# Patient Record
Sex: FEMALE | Race: WHITE | NOT HISPANIC OR LATINO | Employment: OTHER | URBAN - NONMETROPOLITAN AREA
[De-identification: names, ages, dates, MRNs, and addresses within clinical notes are randomized per-mention and may not be internally consistent; named-entity substitution may affect disease eponyms.]

---

## 2022-05-16 ENCOUNTER — OFFICE VISIT (OUTPATIENT)
Dept: CARDIOLOGY | Facility: CLINIC | Age: 72
End: 2022-05-16

## 2022-05-16 VITALS
WEIGHT: 158 LBS | BODY MASS INDEX: 29.08 KG/M2 | HEIGHT: 62 IN | SYSTOLIC BLOOD PRESSURE: 130 MMHG | HEART RATE: 60 BPM | OXYGEN SATURATION: 98 % | DIASTOLIC BLOOD PRESSURE: 76 MMHG

## 2022-05-16 DIAGNOSIS — I10 PRIMARY HYPERTENSION: ICD-10-CM

## 2022-05-16 DIAGNOSIS — I48.0 PAROXYSMAL ATRIAL FIBRILLATION: Primary | ICD-10-CM

## 2022-05-16 DIAGNOSIS — R31.9 HEMATURIA, UNSPECIFIED TYPE: ICD-10-CM

## 2022-05-16 PROCEDURE — 99205 OFFICE O/P NEW HI 60 MIN: CPT | Performed by: INTERNAL MEDICINE

## 2022-05-16 PROCEDURE — 93000 ELECTROCARDIOGRAM COMPLETE: CPT | Performed by: INTERNAL MEDICINE

## 2022-05-16 RX ORDER — AMIODARONE HYDROCHLORIDE 200 MG/1
TABLET ORAL
COMMUNITY

## 2022-05-16 RX ORDER — LOSARTAN POTASSIUM AND HYDROCHLOROTHIAZIDE 12.5; 5 MG/1; MG/1
TABLET ORAL
COMMUNITY

## 2022-05-16 RX ORDER — ASPIRIN 81 MG/1
TABLET, CHEWABLE ORAL
COMMUNITY

## 2022-05-16 RX ORDER — SIMVASTATIN 20 MG
TABLET ORAL
Status: ON HOLD | COMMUNITY
End: 2022-07-12

## 2022-05-16 RX ORDER — POTASSIUM CHLORIDE 20 MEQ/1
TABLET, EXTENDED RELEASE ORAL
COMMUNITY

## 2022-05-16 RX ORDER — SELENIUM SULFIDE 22.5 MG/ML
SHAMPOO TOPICAL
COMMUNITY
Start: 2022-01-14

## 2022-05-16 RX ORDER — METOPROLOL SUCCINATE 25 MG/1
TABLET, EXTENDED RELEASE ORAL
COMMUNITY
End: 2022-07-13 | Stop reason: HOSPADM

## 2022-05-16 RX ORDER — ACETAMINOPHEN 500 MG
500 TABLET ORAL EVERY 6 HOURS
COMMUNITY

## 2022-05-16 NOTE — PROGRESS NOTES
[facility] - CARDIOLOGY  New Patient Initial Outpatient Evaluation    Primary Care Physician: Mone Rodríguez MD    Subjective     Chief Complaint: Consider left atrial appendage occlusion    History of Present Illness  71-year-old kindly referred to the [facility] by REGULO Guzmán, for consideration of left atrial appendage occlusion.  She had recently been admitted to the hospital at [facility], after abrupt onset of palpitations and was found to be in atrial fibrillation with rapid ventricular response.  Documentation suggest she converted back to sinus rhythm on IV Cardizem.  She was given aspirin for CVA prophylaxis, due to chronic hematuria.  She was also given amiodarone to try to help maintain sinus rhythm.    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  In Jan, got hematuria and dose of this was reduced.  Was hospitalized 4/25-4/27; took imbruvica that day. Also had colitis w/prior study drug. Has hemorrhoids - has streaks on toilet paper.     Review of Systems   Constitutional: Negative for malaise/fatigue.   Cardiovascular: Positive for dyspnea on exertion and palpitations. Negative for chest pain, claudication, leg swelling, near-syncope, orthopnea, paroxysmal nocturnal dyspnea and syncope.   Respiratory: Negative for shortness of breath.    Hematologic/Lymphatic: Does not bruise/bleed easily.        Otherwise complete ROS reviewed and negative except as mentioned in the HPI.      Past Medical History:   Past Medical History:   Diagnosis Date   • Abnormal ECG    • Arrhythmia    • Atrial fibrillation (HCC)    • Cancer (HCC)    • Chronic kidney disease    • COPD (chronic obstructive pulmonary disease) (HCC)    • Deep vein thrombosis (HCC)    • Hyperlipidemia    • Hypertension        Past Surgical History:History reviewed. No pertinent surgical history.    Family History: family history includes Heart disease in her mother.    Social History:   "reports that she quit smoking about 16 years ago. She started smoking about 60 years ago. She has never used smokeless tobacco. She reports previous alcohol use. She reports that she does not use drugs.    Medications:  Prior to Admission medications    Medication Sig Start Date End Date Taking? Authorizing Provider   acetaminophen (TYLENOL) 500 MG tablet Every 6 (Six) Hours.   Yes Xiao Bob MD   amiodarone (PACERONE) 200 MG tablet amiodarone 200 mg tablet   TAKE 1 TABLET BY MOUTH TWICE DAILY   Yes Xiao Bob MD   aspirin 81 MG chewable tablet aspirin 81 mg chewable tablet   Chew 1 tablet every day by oral route.   Yes Xiao Bob MD   Cholecalciferol 50 MCG (2000 UT) capsule Vitamin D3 50 mcg (2,000 unit) capsule   Take by oral route.   Yes Xiao Bob MD   losartan-hydrochlorothiazide (HYZAAR) 50-12.5 MG per tablet losartan 50 mg-hydrochlorothiazide 12.5 mg tablet   TAKE 1 TABLET BY MOUTH ONCE DAILY   Yes Xiao Bob MD   metoprolol succinate XL (TOPROL-XL) 25 MG 24 hr tablet metoprolol succinate ER 25 mg tablet,extended release 24 hr   Take 1 tablet every day by oral route.   Yes Xiao Bob MD   potassium chloride (K-DUR,KLOR-CON) 20 MEQ CR tablet potassium chloride ER 20 mEq tablet,extended release   Take 1 tablet every day by oral route.   Yes Xiao Bob MD   Selenium Sulfide 2.25 % shampoo selenium sulfide 2.25 % shampoo   APPLY TO WET SCALP ONCE WEEKLY, WORK INTO A FULL LATHER, LEAVE ON SCALP FOR 2-3 MINUTES, RINSE THOUROUGHLY, AND THEN PAT DRY 1/14/22  Yes Xiao Bob MD   simvastatin (ZOCOR) 20 MG tablet simvastatin 20 mg tablet   TAKE 1 TABLET BY MOUTH ONCE DAILY   Yes Xiao Bob MD     Allergies:  No Known Allergies    Objective     Vital Signs: /76   Pulse 60   Ht 157.5 cm (62\")   Wt 71.7 kg (158 lb)   SpO2 98%   BMI 28.90 kg/m²     Vitals and nursing note reviewed.   Constitutional:       General: " Not in acute distress.     Appearance: Not in distress.   Neck:      Vascular: No JVD or JVR. JVD normal.   Pulmonary:      Effort: Pulmonary effort is normal.      Breath sounds: Normal breath sounds.   Cardiovascular:      Normal rate. Regular rhythm.      Murmurs: There is no murmur.      No gallop. No rub.   Pulses:     Intact distal pulses.   Skin:     General: Skin is warm and dry.   Neurological:      Mental Status: Alert, oriented to person, place, and time and oriented to person, place and time.         Results Reviewed:      ECG 12 Lead    Date/Time: 5/16/2022 4:34 PM  Performed by: Steve Lynn MD  Authorized by: Steve Lynn MD   Comparison: compared with previous ECG from 5/10/2022  Similar to previous ECG  Rhythm: sinus rhythm  Rate: normal  BPM: 60  Conduction: left anterior fascicular block  QRS axis: left    Clinical impression: abnormal EKG              Echocardiogram performed at [facility] on 4/26/2022 was reviewed.  This reports an estimated LVEF of 64%, mild LVH, grade 2 diastolic dysfunction, normal size left atrium, normal size and function of the right ventricle.  Mild mitral regurgitation with no other significant valvular pathologies mentioned.    .  Assessment / Plan        Problem List Items Addressed This Visit        Cardiac and Vasculature    Atrial fibrillation (HCC) - Primary    Overview     CHADS-VASc Risk Assessment            3 Total Score    1 Hypertension    1 Age 65-74    1 Sex: Female        Criteria that do not apply:    CHF    Age >/= 75    DM    Vascular Disease                   Relevant Medications    metoprolol succinate XL (TOPROL-XL) 25 MG 24 hr tablet    Other Relevant Orders    ECG 12 Lead    Hypertension    Relevant Medications    losartan-hydrochlorothiazide (HYZAAR) 50-12.5 MG per tablet    metoprolol succinate XL (TOPROL-XL) 25 MG 24 hr tablet       Genitourinary and Reproductive     Hematuria        Recommendations and plans:  Patient has recently diagnosed paroxysmal atrial fibrillation with a CHA2DS2-VASc=3, which warrants CVA prophylaxis.  This patient is currently being treated with aspirin (anticoagulation was not initially prescribed due to concern regarding recent hematuria and history of colitis), but seeks an alternative to indefinite anticoagulation because the same issues for which anticoagulation was not initially prescribed.  As such, I do think this patient meets indications for left atrial appendage occlusion with Watchman device.  We spent the bulk of today's visit discussing the indications, preoperative evaluation, procedural conduct, and postprocedural follow-up care and medication management, emphasizing the need for 6 weeks of anticoagulant therapy (in addition to aspirin 81 mg daily) after a successful implant.  We also discussed need for repeat MAURY 6 weeks after the procedure and, if appendage felt to be successfully excluded at that point in time, that the anticoagulant is discontinued but clopidogrel 75 mg daily will be prescribed to be taken for 4.5 months.    Patient understands all this, and would like to discuss further with her hematologist/oncologist at [place], with whom she has an appointment later this week.  She states she will call us back after that, and if she elects to move forward, I will order a MAURY to assess her left atrial appendage for anatomic feasibility of closure.    In the meantime, we did discuss her current risk of stroke based upon CHADS2-VASc or of 3.2% annually, the fact that taking aspirin 81 mg daily reduces this to approximately 2.7% annually, and that with Xarelto or Eliquis her risk of stroke would be equivalent to that which it would be if she did not have atrial fibrillation (less than 1%).  We also discussed that, with successful Watchman implant, ultimately (in 6 months), she would have a similar stroke risk (less than 1%) but with only taking aspirin 81 mg daily and thus a  significantly lower bleeding risk that would otherwise be conferred with the other option to attain this low stroke risk (ie DOAC).      Therefore, for now, she will continue aspirin and amiodarone as prescribed by Dr. Knight and Mrs. Redding.  If ultimately she elects to proceed with Watchman and we are able to provide her full CVA prophylaxis with LAAC+aspirin, it may be reasonable for her to discontinue amiodarone, unless she were felt to be symptomatic from rate-controlled atrial fibrillation (which is a distinction yet to be made, since she initially presented with symptoms but was in atrial fibrillation with rapid ventricular response).  Ultimately, this decision (regarding antiarrhythmic therapy), will be made by her primary cardiologist, Dr. Knight.    She was also seen today in clinic by our structural heart coordinator, Alejandrina Reinoso RN, and provided with educational materials regarding Watchman device.    Thank you very much for the referral    61 minutes spent on day of service    Steve Lynn MD   05/16/22   16:36 CDT

## 2022-05-18 ENCOUNTER — PATIENT ROUNDING (BHMG ONLY) (OUTPATIENT)
Dept: CARDIOLOGY | Facility: CLINIC | Age: 72
End: 2022-05-18

## 2022-05-18 NOTE — PROGRESS NOTES
A [employer] message has been sent to the patient for PATIENT ROUNDING with [facility].

## 2022-05-20 ENCOUNTER — TELEPHONE (OUTPATIENT)
Dept: CARDIOLOGY | Facility: CLINIC | Age: 72
End: 2022-05-20

## 2022-05-20 ENCOUNTER — PREP FOR SURGERY (OUTPATIENT)
Dept: OTHER | Facility: HOSPITAL | Age: 72
End: 2022-05-20

## 2022-05-20 DIAGNOSIS — I48.0 PAF (PAROXYSMAL ATRIAL FIBRILLATION): Primary | ICD-10-CM

## 2022-05-20 RX ORDER — SODIUM CHLORIDE 9 MG/ML
20 INJECTION, SOLUTION INTRAVENOUS CONTINUOUS
Status: CANCELLED | OUTPATIENT
Start: 2022-05-20

## 2022-05-20 RX ORDER — SODIUM CHLORIDE 0.9 % (FLUSH) 0.9 %
10 SYRINGE (ML) INJECTION AS NEEDED
Status: CANCELLED | OUTPATIENT
Start: 2022-05-20

## 2022-05-20 RX ORDER — SODIUM CHLORIDE 0.9 % (FLUSH) 0.9 %
10 SYRINGE (ML) INJECTION EVERY 12 HOURS SCHEDULED
Status: CANCELLED | OUTPATIENT
Start: 2022-05-20

## 2022-05-20 NOTE — TELEPHONE ENCOUNTER
Pt called and said that she has spoken with her doctor at [place] and would like to proceed with the Watchman work up. I explained the pre-cert for MAURY and that we would call once scheduled. She verbalized understanding.

## 2022-05-27 ENCOUNTER — LAB (OUTPATIENT)
Dept: LAB | Facility: HOSPITAL | Age: 72
End: 2022-05-27

## 2022-05-27 DIAGNOSIS — I48.0 PAF (PAROXYSMAL ATRIAL FIBRILLATION): ICD-10-CM

## 2022-05-27 LAB — SARS-COV-2 RNA PNL SPEC NAA+PROBE: NOT DETECTED

## 2022-05-27 PROCEDURE — 87635 SARS-COV-2 COVID-19 AMP PRB: CPT

## 2022-05-27 PROCEDURE — C9803 HOPD COVID-19 SPEC COLLECT: HCPCS

## 2022-05-31 ENCOUNTER — HOSPITAL ENCOUNTER (OUTPATIENT)
Dept: CARDIOLOGY | Facility: HOSPITAL | Age: 72
Discharge: HOME | End: 2022-05-31

## 2022-05-31 ENCOUNTER — ANESTHESIA (OUTPATIENT)
Dept: CARDIOLOGY | Facility: HOSPITAL | Age: 72
End: 2022-05-31

## 2022-05-31 ENCOUNTER — ANESTHESIA EVENT (OUTPATIENT)
Dept: CARDIOLOGY | Facility: HOSPITAL | Age: 72
End: 2022-05-31

## 2022-05-31 VITALS
WEIGHT: 159.2 LBS | OXYGEN SATURATION: 93 % | RESPIRATION RATE: 20 BRPM | BODY MASS INDEX: 29.3 KG/M2 | DIASTOLIC BLOOD PRESSURE: 64 MMHG | TEMPERATURE: 96.8 F | SYSTOLIC BLOOD PRESSURE: 150 MMHG | HEART RATE: 50 BPM | HEIGHT: 62 IN

## 2022-05-31 DIAGNOSIS — I48.0 PAF (PAROXYSMAL ATRIAL FIBRILLATION): ICD-10-CM

## 2022-05-31 PROCEDURE — 93320 DOPPLER ECHO COMPLETE: CPT | Performed by: INTERNAL MEDICINE

## 2022-05-31 PROCEDURE — 93325 DOPPLER ECHO COLOR FLOW MAPG: CPT | Performed by: INTERNAL MEDICINE

## 2022-05-31 PROCEDURE — 93320 DOPPLER ECHO COMPLETE: CPT

## 2022-05-31 PROCEDURE — 25010000002 PROPOFOL 10 MG/ML EMULSION: Performed by: NURSE ANESTHETIST, CERTIFIED REGISTERED

## 2022-05-31 PROCEDURE — 93312 ECHO TRANSESOPHAGEAL: CPT | Performed by: INTERNAL MEDICINE

## 2022-05-31 PROCEDURE — 93325 DOPPLER ECHO COLOR FLOW MAPG: CPT

## 2022-05-31 PROCEDURE — 93312 ECHO TRANSESOPHAGEAL: CPT

## 2022-05-31 RX ORDER — SODIUM CHLORIDE 9 MG/ML
20 INJECTION, SOLUTION INTRAVENOUS CONTINUOUS
Status: DISCONTINUED | OUTPATIENT
Start: 2022-05-31 | End: 2022-06-01 | Stop reason: HOSPADM

## 2022-05-31 RX ORDER — SODIUM CHLORIDE 0.9 % (FLUSH) 0.9 %
10 SYRINGE (ML) INJECTION EVERY 12 HOURS SCHEDULED
Status: DISCONTINUED | OUTPATIENT
Start: 2022-05-31 | End: 2022-06-01 | Stop reason: HOSPADM

## 2022-05-31 RX ORDER — PROPOFOL 10 MG/ML
VIAL (ML) INTRAVENOUS AS NEEDED
Status: DISCONTINUED | OUTPATIENT
Start: 2022-05-31 | End: 2022-05-31 | Stop reason: SURG

## 2022-05-31 RX ORDER — SODIUM CHLORIDE 0.9 % (FLUSH) 0.9 %
10 SYRINGE (ML) INJECTION AS NEEDED
Status: DISCONTINUED | OUTPATIENT
Start: 2022-05-31 | End: 2022-06-01 | Stop reason: HOSPADM

## 2022-05-31 RX ORDER — LIDOCAINE HYDROCHLORIDE 20 MG/ML
INJECTION, SOLUTION EPIDURAL; INFILTRATION; INTRACAUDAL; PERINEURAL AS NEEDED
Status: DISCONTINUED | OUTPATIENT
Start: 2022-05-31 | End: 2022-05-31 | Stop reason: SURG

## 2022-05-31 RX ADMIN — SODIUM CHLORIDE 20 ML/HR: 9 INJECTION, SOLUTION INTRAVENOUS at 11:55

## 2022-05-31 RX ADMIN — LIDOCAINE HYDROCHLORIDE 50 MG: 20 INJECTION, SOLUTION EPIDURAL; INFILTRATION; INTRACAUDAL; PERINEURAL at 12:51

## 2022-05-31 RX ADMIN — PROPOFOL 50 MG: 10 INJECTION, EMULSION INTRAVENOUS at 12:52

## 2022-05-31 RX ADMIN — LIDOCAINE HYDROCHLORIDE 50 MG: 20 INJECTION, SOLUTION EPIDURAL; INFILTRATION; INTRACAUDAL; PERINEURAL at 12:47

## 2022-05-31 RX ADMIN — PROPOFOL 50 MG: 10 INJECTION, EMULSION INTRAVENOUS at 12:48

## 2022-05-31 NOTE — ANESTHESIA PREPROCEDURE EVALUATION
Anesthesia Evaluation     NPO Solid Status: > 8 hours  NPO Liquid Status: > 4 hours           Airway   Mallampati: II  TM distance: >3 FB  Neck ROM: full  Dental    (+) upper dentures and lower dentures    Pulmonary    (+) COPD,   Cardiovascular   Exercise tolerance: good (4-7 METS)    (+) hypertension, dysrhythmias Atrial Fib, DVT, hyperlipidemia,       Neuro/Psych- negative ROS  GI/Hepatic/Renal/Endo      Musculoskeletal (-) negative ROS    Abdominal    Substance History - negative use     OB/GYN negative ob/gyn ROS         Other                        Anesthesia Plan    ASA 2     MAC     intravenous induction     Anesthetic plan, all risks, benefits, and alternatives have been provided, discussed and informed consent has been obtained with: patient.  Use of blood products discussed with patient .       CODE STATUS:

## 2022-06-01 ENCOUNTER — TELEPHONE (OUTPATIENT)
Dept: CARDIOLOGY | Facility: CLINIC | Age: 72
End: 2022-06-01

## 2022-06-01 LAB
MAXIMAL PREDICTED HEART RATE: 147 BPM
STRESS TARGET HR: 125 BPM

## 2022-06-01 NOTE — TELEPHONE ENCOUNTER
Call to pt to let her know that Dr. Lynn thought her ДМИТРИЙ looked small, but implantable. We have sent her images to the company for review.     We did plan her shared decision appt. She verbalized understanding of the plan of care.

## 2022-06-22 ENCOUNTER — OFFICE VISIT (OUTPATIENT)
Dept: CARDIOLOGY | Facility: CLINIC | Age: 72
End: 2022-06-22

## 2022-06-22 VITALS
HEIGHT: 62 IN | WEIGHT: 163 LBS | BODY MASS INDEX: 30 KG/M2 | DIASTOLIC BLOOD PRESSURE: 84 MMHG | HEART RATE: 50 BPM | SYSTOLIC BLOOD PRESSURE: 118 MMHG | OXYGEN SATURATION: 97 %

## 2022-06-22 DIAGNOSIS — I10 HYPERTENSION, UNSPECIFIED TYPE: ICD-10-CM

## 2022-06-22 DIAGNOSIS — I48.0 PAROXYSMAL ATRIAL FIBRILLATION: Primary | ICD-10-CM

## 2022-06-22 DIAGNOSIS — R31.0 GROSS HEMATURIA: ICD-10-CM

## 2022-06-22 PROBLEM — E78.5 DYSLIPIDEMIA: Status: ACTIVE | Noted: 2022-06-22

## 2022-06-22 PROBLEM — C85.90 LYMPHOMA: Status: ACTIVE | Noted: 2022-06-22

## 2022-06-22 PROBLEM — N18.30 CKD (CHRONIC KIDNEY DISEASE) STAGE 3, GFR 30-59 ML/MIN: Status: ACTIVE | Noted: 2022-06-22

## 2022-06-22 PROCEDURE — 99214 OFFICE O/P EST MOD 30 MIN: CPT | Performed by: INTERNAL MEDICINE

## 2022-06-22 NOTE — PROGRESS NOTES
Reason for Visit: Left atrial appendage closure shared decision making.     HPI:  Justine Bosch is a 71 y.o. female is here today for Left atrial appendage closure shared decision making.  She recently saw Dr. Lynn in clinic for consideration of left atrial appendage occlusion with the watchman device.  She has a history of chronic hematuria and is therefore felt to be a poor candidate for chronic anticoagulation.  She is searching for alternative methods of prophylaxis of stroke.  She is previously admitted to the hospital at [facility] in April with atrial fibrillation with RVR and spontaneously converted back to sinus rhythm.  She has not had any recurrence of atrial fibrillation since being on the amiodarone.  She had to stop her simvastatin due to interactions with the amiodarone.       Patient Active Problem List   Diagnosis   • Atrial fibrillation (HCC)   • Hypertension   • Hematuria   • CKD (chronic kidney disease) stage 3, GFR 30-59 ml/min (HCC)   • Dyslipidemia   • Lymphoma (HCC)       Social History     Tobacco Use   • Smoking status: Former Smoker     Start date:      Quit date:      Years since quittin.4   • Smokeless tobacco: Never Used   Vaping Use   • Vaping Use: Never used   Substance Use Topics   • Alcohol use: Not Currently   • Drug use: Never       Family History   Problem Relation Age of Onset   • Heart disease Mother        The following portions of the patient's history were reviewed and updated as appropriate: allergies, current medications, past family history, past medical history, past social history, past surgical history and problem list.      Current Outpatient Medications:   •  acetaminophen (TYLENOL) 500 MG tablet, Every 6 (Six) Hours., Disp: , Rfl:   •  amiodarone (PACERONE) 200 MG tablet, amiodarone 200 mg tablet  TAKE 1 TABLET BY MOUTH TWICE DAILY, Disp: , Rfl:   •  aspirin 81 MG chewable tablet, aspirin 81 mg chewable tablet  Chew 1 tablet every day by  "oral route., Disp: , Rfl:   •  Cholecalciferol 50 MCG (2000 UT) capsule, Vitamin D3 50 mcg (2,000 unit) capsule  Take by oral route., Disp: , Rfl:   •  losartan-hydrochlorothiazide (HYZAAR) 50-12.5 MG per tablet, losartan 50 mg-hydrochlorothiazide 12.5 mg tablet  TAKE 1 TABLET BY MOUTH ONCE DAILY, Disp: , Rfl:   •  metoprolol succinate XL (TOPROL-XL) 25 MG 24 hr tablet, metoprolol succinate ER 25 mg tablet,extended release 24 hr  Take 1 tablet every day by oral route., Disp: , Rfl:   •  potassium chloride (K-DUR,KLOR-CON) 20 MEQ CR tablet, potassium chloride ER 20 mEq tablet,extended release  Take 1 tablet every day by oral route., Disp: , Rfl:   •  Selenium Sulfide 2.25 % shampoo, selenium sulfide 2.25 % shampoo  APPLY TO WET SCALP ONCE WEEKLY, WORK INTO A FULL LATHER, LEAVE ON SCALP FOR 2-3 MINUTES, RINSE THOUROUGHLY, AND THEN PAT DRY, Disp: , Rfl:   •  simvastatin (ZOCOR) 20 MG tablet, simvastatin 20 mg tablet  TAKE 1 TABLET BY MOUTH ONCE DAILY, Disp: , Rfl:     Review of Systems   Constitutional: Negative for chills and fever.   Cardiovascular: Positive for leg swelling. Negative for chest pain and paroxysmal nocturnal dyspnea.   Respiratory: Negative for cough and shortness of breath.    Hematologic/Lymphatic: Bruises/bleeds easily.   Skin: Negative for rash.   Gastrointestinal: Negative for abdominal pain and heartburn.   Neurological: Negative for dizziness and numbness.       Objective   /84 (BP Location: Left arm, Patient Position: Sitting, Cuff Size: Adult)   Pulse 50   Ht 157.5 cm (62.01\")   Wt 73.9 kg (163 lb)   SpO2 97%   BMI 29.81 kg/m²   Constitutional:       Appearance: Well-developed and overweight.   HENT:      Head: Normocephalic and atraumatic.   Pulmonary:      Effort: Pulmonary effort is normal.      Breath sounds: Normal breath sounds.   Cardiovascular:      Bradycardia present. Regular rhythm.      Murmurs: There is no murmur.      No gallop. No click.   Edema:     Peripheral edema " present.     Pretibial: bilateral trace edema of the pretibial area.     Ankle: bilateral 1+ edema of the ankle.  Skin:     General: Skin is warm and dry.   Neurological:      Mental Status: Alert and oriented to person, place, and time.       Procedures         ICD-10-CM ICD-9-CM   1. Paroxysmal atrial fibrillation (HCC)  I48.0 427.31   2. Hypertension, unspecified type  I10 401.9   3. Gross hematuria  R31.0 599.71         Assessment/Plan:  Based on the history, it has been determined that the patient is a poor candidate for long-term oral anticoagulation.  The patient may, however, be tolerant to short-term anticoagulation as necessary.    Specifically regarding anticoagulation they have demonstrated: hematuria    We have discussed that you need stroke and bleeding risk on and off anticoagulation.  The individual [id number] stroke risk store is 3 (hypertension, female sex, age 65-75), indicating an adjusted stroke rate of 3.2%/year.    Based on both stroke and bleeding risk, shared decision has been made to pursue closure of the left atrial appendage is a safe, effective alternative to long-term oral anticoagulation therapy for stroke prophylaxis.  This will reduce her long-term risk of incidence of bleeding.  Information regarding left atrial appendage closure and shared decision making were provided to patient.

## 2022-06-28 ENCOUNTER — PREP FOR SURGERY (OUTPATIENT)
Dept: OTHER | Facility: HOSPITAL | Age: 72
End: 2022-06-28

## 2022-06-28 DIAGNOSIS — I48.0 PAF (PAROXYSMAL ATRIAL FIBRILLATION): Primary | ICD-10-CM

## 2022-06-28 RX ORDER — SODIUM CHLORIDE 0.9 % (FLUSH) 0.9 %
10 SYRINGE (ML) INJECTION AS NEEDED
Status: CANCELLED | OUTPATIENT
Start: 2022-06-28

## 2022-06-28 RX ORDER — SODIUM CHLORIDE, SODIUM LACTATE, POTASSIUM CHLORIDE, CALCIUM CHLORIDE 600; 310; 30; 20 MG/100ML; MG/100ML; MG/100ML; MG/100ML
1 INJECTION, SOLUTION INTRAVENOUS CONTINUOUS
Status: CANCELLED | OUTPATIENT
Start: 2022-06-28

## 2022-06-28 RX ORDER — ASPIRIN 81 MG/1
324 TABLET, CHEWABLE ORAL ONCE
Status: CANCELLED | OUTPATIENT
Start: 2022-06-28

## 2022-06-28 RX ORDER — BUPIVACAINE HCL/0.9 % NACL/PF 0.1 %
2 PLASTIC BAG, INJECTION (ML) EPIDURAL ONCE
Status: CANCELLED | OUTPATIENT
Start: 2022-06-28 | End: 2022-06-28

## 2022-06-28 RX ORDER — SODIUM CHLORIDE 0.9 % (FLUSH) 0.9 %
10 SYRINGE (ML) INJECTION EVERY 12 HOURS SCHEDULED
Status: CANCELLED | OUTPATIENT
Start: 2022-06-28

## 2022-07-11 ENCOUNTER — HOSPITAL ENCOUNTER (OUTPATIENT)
Dept: GENERAL RADIOLOGY | Facility: HOSPITAL | Age: 72
Discharge: HOME | End: 2022-07-11

## 2022-07-11 ENCOUNTER — HOSPITAL ENCOUNTER (OUTPATIENT)
Dept: CARDIOLOGY | Facility: HOSPITAL | Age: 72
Discharge: HOME | End: 2022-07-11

## 2022-07-11 DIAGNOSIS — I48.0 PAF (PAROXYSMAL ATRIAL FIBRILLATION): ICD-10-CM

## 2022-07-11 LAB
ABO GROUP BLD: NORMAL
ALBUMIN SERPL-MCNC: 4.3 G/DL (ref 3.5–5.2)
ALBUMIN/GLOB SERPL: 1.8 G/DL
ALP SERPL-CCNC: 94 U/L (ref 39–117)
ALT SERPL W P-5'-P-CCNC: 29 U/L (ref 1–33)
ANION GAP SERPL CALCULATED.3IONS-SCNC: 11 MMOL/L (ref 5–15)
APTT PPP: 25.6 SECONDS (ref 24.1–35)
AST SERPL-CCNC: 30 U/L (ref 1–32)
BILIRUB SERPL-MCNC: 0.6 MG/DL (ref 0–1.2)
BLD GP AB SCN SERPL QL: NEGATIVE
BUN SERPL-MCNC: 27 MG/DL (ref 8–23)
BUN/CREAT SERPL: 19.3 (ref 7–25)
CALCIUM SPEC-SCNC: 9.8 MG/DL (ref 8.6–10.5)
CHLORIDE SERPL-SCNC: 102 MMOL/L (ref 98–107)
CO2 SERPL-SCNC: 30 MMOL/L (ref 22–29)
CREAT SERPL-MCNC: 1.4 MG/DL (ref 0.57–1)
DEPRECATED RDW RBC AUTO: 52.1 FL (ref 37–54)
EGFRCR SERPLBLD CKD-EPI 2021: 39.8 ML/MIN/1.73
ERYTHROCYTE [DISTWIDTH] IN BLOOD BY AUTOMATED COUNT: 16 % (ref 12.3–15.4)
GLOBULIN UR ELPH-MCNC: 2.4 GM/DL
GLUCOSE SERPL-MCNC: 88 MG/DL (ref 65–99)
HCT VFR BLD AUTO: 41.7 % (ref 34–46.6)
HGB BLD-MCNC: 12.9 G/DL (ref 12–15.9)
INR PPP: 0.98 (ref 0.91–1.09)
MCH RBC QN AUTO: 27.3 PG (ref 26.6–33)
MCHC RBC AUTO-ENTMCNC: 30.9 G/DL (ref 31.5–35.7)
MCV RBC AUTO: 88.3 FL (ref 79–97)
PLATELET # BLD AUTO: 188 10*3/MM3 (ref 140–450)
PMV BLD AUTO: 10.1 FL (ref 6–12)
POTASSIUM SERPL-SCNC: 4.2 MMOL/L (ref 3.5–5.2)
PROT SERPL-MCNC: 6.7 G/DL (ref 6–8.5)
PROTHROMBIN TIME: 12.6 SECONDS (ref 11.9–14.6)
RBC # BLD AUTO: 4.72 10*6/MM3 (ref 3.77–5.28)
RH BLD: POSITIVE
SODIUM SERPL-SCNC: 143 MMOL/L (ref 136–145)
T&S EXPIRATION DATE: NORMAL
WBC NRBC COR # BLD: 5.38 10*3/MM3 (ref 3.4–10.8)

## 2022-07-11 PROCEDURE — 86901 BLOOD TYPING SEROLOGIC RH(D): CPT | Performed by: INTERNAL MEDICINE

## 2022-07-11 PROCEDURE — 80053 COMPREHEN METABOLIC PANEL: CPT | Performed by: INTERNAL MEDICINE

## 2022-07-11 PROCEDURE — 71046 X-RAY EXAM CHEST 2 VIEWS: CPT

## 2022-07-11 PROCEDURE — 85027 COMPLETE CBC AUTOMATED: CPT | Performed by: INTERNAL MEDICINE

## 2022-07-11 PROCEDURE — 93005 ELECTROCARDIOGRAM TRACING: CPT | Performed by: INTERNAL MEDICINE

## 2022-07-11 PROCEDURE — 85730 THROMBOPLASTIN TIME PARTIAL: CPT | Performed by: INTERNAL MEDICINE

## 2022-07-11 PROCEDURE — 86850 RBC ANTIBODY SCREEN: CPT | Performed by: INTERNAL MEDICINE

## 2022-07-11 PROCEDURE — 93010 ELECTROCARDIOGRAM REPORT: CPT | Performed by: INTERNAL MEDICINE

## 2022-07-11 PROCEDURE — 85610 PROTHROMBIN TIME: CPT | Performed by: INTERNAL MEDICINE

## 2022-07-11 PROCEDURE — 86900 BLOOD TYPING SEROLOGIC ABO: CPT | Performed by: INTERNAL MEDICINE

## 2022-07-12 ENCOUNTER — ANESTHESIA EVENT (OUTPATIENT)
Dept: CARDIOLOGY | Facility: HOSPITAL | Age: 72
End: 2022-07-12

## 2022-07-12 ENCOUNTER — APPOINTMENT (OUTPATIENT)
Dept: CARDIOLOGY | Facility: HOSPITAL | Age: 72
End: 2022-07-12

## 2022-07-12 ENCOUNTER — HOSPITAL ENCOUNTER (INPATIENT)
Facility: HOSPITAL | Age: 72
LOS: 1 days | Discharge: HOME | End: 2022-07-13
Attending: INTERNAL MEDICINE | Admitting: INTERNAL MEDICINE

## 2022-07-12 ENCOUNTER — ANESTHESIA (OUTPATIENT)
Dept: CARDIOLOGY | Facility: HOSPITAL | Age: 72
End: 2022-07-12

## 2022-07-12 DIAGNOSIS — I48.0 PAF (PAROXYSMAL ATRIAL FIBRILLATION): ICD-10-CM

## 2022-07-12 DIAGNOSIS — I48.0 PAROXYSMAL ATRIAL FIBRILLATION: ICD-10-CM

## 2022-07-12 DIAGNOSIS — Z95.818 PRESENCE OF WATCHMAN LEFT ATRIAL APPENDAGE CLOSURE DEVICE: Primary | ICD-10-CM

## 2022-07-12 LAB
ACT BLD: 242 SECONDS (ref 82–152)
MAXIMAL PREDICTED HEART RATE: 147 BPM
SARS-COV-2 RNA PNL SPEC NAA+PROBE: NOT DETECTED
STRESS TARGET HR: 125 BPM

## 2022-07-12 PROCEDURE — 33340 PERQ CLSR TCAT L ATR APNDGE: CPT | Performed by: INTERNAL MEDICINE

## 2022-07-12 PROCEDURE — 93355 ECHO TRANSESOPHAGEAL (TEE): CPT | Performed by: INTERNAL MEDICINE

## 2022-07-12 PROCEDURE — 25010000002 HEPARIN (PORCINE) PER 1000 UNITS: Performed by: NURSE ANESTHETIST, CERTIFIED REGISTERED

## 2022-07-12 PROCEDURE — C1894 INTRO/SHEATH, NON-LASER: HCPCS | Performed by: INTERNAL MEDICINE

## 2022-07-12 PROCEDURE — 25010000002 HEPARIN (PORCINE) 2000-0.9 UNIT/L-% SOLUTION: Performed by: INTERNAL MEDICINE

## 2022-07-12 PROCEDURE — C1893 INTRO/SHEATH, FIXED,NON-PEEL: HCPCS | Performed by: INTERNAL MEDICINE

## 2022-07-12 PROCEDURE — 93355 ECHO TRANSESOPHAGEAL (TEE): CPT

## 2022-07-12 PROCEDURE — C1769 GUIDE WIRE: HCPCS | Performed by: INTERNAL MEDICINE

## 2022-07-12 PROCEDURE — 25010000002 CEFAZOLIN PER 500 MG: Performed by: INTERNAL MEDICINE

## 2022-07-12 PROCEDURE — 85347 COAGULATION TIME ACTIVATED: CPT

## 2022-07-12 PROCEDURE — 25010000002 DEXAMETHASONE PER 1 MG: Performed by: NURSE ANESTHETIST, CERTIFIED REGISTERED

## 2022-07-12 PROCEDURE — 0 IOPAMIDOL PER 1 ML: Performed by: INTERNAL MEDICINE

## 2022-07-12 PROCEDURE — 87635 SARS-COV-2 COVID-19 AMP PRB: CPT | Performed by: INTERNAL MEDICINE

## 2022-07-12 PROCEDURE — C1889 IMPLANT/INSERT DEVICE, NOC: HCPCS | Performed by: INTERNAL MEDICINE

## 2022-07-12 PROCEDURE — 02L73DK OCCLUSION OF LEFT ATRIAL APPENDAGE WITH INTRALUMINAL DEVICE, PERCUTANEOUS APPROACH: ICD-10-PCS | Performed by: INTERNAL MEDICINE

## 2022-07-12 PROCEDURE — 25010000002 ONDANSETRON PER 1 MG: Performed by: NURSE ANESTHETIST, CERTIFIED REGISTERED

## 2022-07-12 PROCEDURE — 25010000002 PROPOFOL 10 MG/ML EMULSION: Performed by: NURSE ANESTHETIST, CERTIFIED REGISTERED

## 2022-07-12 PROCEDURE — B24BZZ4 ULTRASONOGRAPHY OF HEART WITH AORTA, TRANSESOPHAGEAL: ICD-10-PCS | Performed by: INTERNAL MEDICINE

## 2022-07-12 DEVICE — LEFT ATRIAL APPENDAGE CLOSURE DEVICE WITH DELIVERY SYSTEM
Type: IMPLANTABLE DEVICE | Status: FUNCTIONAL
Brand: WATCHMAN FLX™

## 2022-07-12 DEVICE — CAP SYS WATCHMAN TRUSEAL ACC PROC: Type: IMPLANTABLE DEVICE | Status: FUNCTIONAL

## 2022-07-12 DEVICE — CAP WATCHMAN FLX PROC: Type: IMPLANTABLE DEVICE | Status: FUNCTIONAL

## 2022-07-12 RX ORDER — ACETAMINOPHEN 325 MG/1
650 TABLET ORAL EVERY 4 HOURS PRN
Status: DISCONTINUED | OUTPATIENT
Start: 2022-07-12 | End: 2022-07-13 | Stop reason: HOSPADM

## 2022-07-12 RX ORDER — SODIUM CHLORIDE 0.9 % (FLUSH) 0.9 %
10 SYRINGE (ML) INJECTION AS NEEDED
Status: DISCONTINUED | OUTPATIENT
Start: 2022-07-12 | End: 2022-07-12

## 2022-07-12 RX ORDER — SODIUM CHLORIDE, SODIUM LACTATE, POTASSIUM CHLORIDE, CALCIUM CHLORIDE 600; 310; 30; 20 MG/100ML; MG/100ML; MG/100ML; MG/100ML
INJECTION, SOLUTION INTRAVENOUS CONTINUOUS PRN
Status: DISCONTINUED | OUTPATIENT
Start: 2022-07-12 | End: 2022-07-12 | Stop reason: SURG

## 2022-07-12 RX ORDER — DEXAMETHASONE SODIUM PHOSPHATE 4 MG/ML
INJECTION, SOLUTION INTRA-ARTICULAR; INTRALESIONAL; INTRAMUSCULAR; INTRAVENOUS; SOFT TISSUE AS NEEDED
Status: DISCONTINUED | OUTPATIENT
Start: 2022-07-12 | End: 2022-07-12 | Stop reason: SURG

## 2022-07-12 RX ORDER — SODIUM CHLORIDE, SODIUM LACTATE, POTASSIUM CHLORIDE, CALCIUM CHLORIDE 600; 310; 30; 20 MG/100ML; MG/100ML; MG/100ML; MG/100ML
1 INJECTION, SOLUTION INTRAVENOUS CONTINUOUS
Status: DISCONTINUED | OUTPATIENT
Start: 2022-07-12 | End: 2022-07-12

## 2022-07-12 RX ORDER — ONDANSETRON 2 MG/ML
4 INJECTION INTRAMUSCULAR; INTRAVENOUS ONCE AS NEEDED
Status: DISCONTINUED | OUTPATIENT
Start: 2022-07-12 | End: 2022-07-12 | Stop reason: HOSPADM

## 2022-07-12 RX ORDER — DROPERIDOL 2.5 MG/ML
0.62 INJECTION, SOLUTION INTRAMUSCULAR; INTRAVENOUS ONCE AS NEEDED
Status: DISCONTINUED | OUTPATIENT
Start: 2022-07-12 | End: 2022-07-12 | Stop reason: HOSPADM

## 2022-07-12 RX ORDER — FLUMAZENIL 0.1 MG/ML
0.2 INJECTION INTRAVENOUS AS NEEDED
Status: DISCONTINUED | OUTPATIENT
Start: 2022-07-12 | End: 2022-07-12 | Stop reason: HOSPADM

## 2022-07-12 RX ORDER — FENTANYL CITRATE 50 UG/ML
25 INJECTION, SOLUTION INTRAMUSCULAR; INTRAVENOUS
Status: DISCONTINUED | OUTPATIENT
Start: 2022-07-12 | End: 2022-07-12 | Stop reason: HOSPADM

## 2022-07-12 RX ORDER — ASPIRIN 81 MG/1
TABLET, CHEWABLE ORAL
Status: DISPENSED
Start: 2022-07-12 | End: 2022-07-12

## 2022-07-12 RX ORDER — BUPIVACAINE HCL/0.9 % NACL/PF 0.1 %
2 PLASTIC BAG, INJECTION (ML) EPIDURAL ONCE
Status: COMPLETED | OUTPATIENT
Start: 2022-07-12 | End: 2022-07-12

## 2022-07-12 RX ORDER — LIDOCAINE HYDROCHLORIDE 10 MG/ML
0.5 INJECTION, SOLUTION EPIDURAL; INFILTRATION; INTRACAUDAL; PERINEURAL ONCE AS NEEDED
Status: DISCONTINUED | OUTPATIENT
Start: 2022-07-12 | End: 2022-07-13 | Stop reason: HOSPADM

## 2022-07-12 RX ORDER — ASPIRIN 81 MG/1
324 TABLET, CHEWABLE ORAL ONCE
Status: COMPLETED | OUTPATIENT
Start: 2022-07-12 | End: 2022-07-12

## 2022-07-12 RX ORDER — SODIUM CHLORIDE 0.9 % (FLUSH) 0.9 %
3-10 SYRINGE (ML) INJECTION AS NEEDED
Status: DISCONTINUED | OUTPATIENT
Start: 2022-07-12 | End: 2022-07-13 | Stop reason: HOSPADM

## 2022-07-12 RX ORDER — LABETALOL HYDROCHLORIDE 5 MG/ML
5 INJECTION, SOLUTION INTRAVENOUS
Status: DISCONTINUED | OUTPATIENT
Start: 2022-07-12 | End: 2022-07-12 | Stop reason: HOSPADM

## 2022-07-12 RX ORDER — SODIUM CHLORIDE 0.9 % (FLUSH) 0.9 %
10 SYRINGE (ML) INJECTION EVERY 12 HOURS SCHEDULED
Status: DISCONTINUED | OUTPATIENT
Start: 2022-07-12 | End: 2022-07-12

## 2022-07-12 RX ORDER — EPHEDRINE SULFATE 50 MG/ML
INJECTION, SOLUTION INTRAVENOUS AS NEEDED
Status: DISCONTINUED | OUTPATIENT
Start: 2022-07-12 | End: 2022-07-12 | Stop reason: SURG

## 2022-07-12 RX ORDER — HEPARIN SODIUM 1000 [USP'U]/ML
INJECTION, SOLUTION INTRAVENOUS; SUBCUTANEOUS AS NEEDED
Status: DISCONTINUED | OUTPATIENT
Start: 2022-07-12 | End: 2022-07-12 | Stop reason: SURG

## 2022-07-12 RX ORDER — LIDOCAINE HYDROCHLORIDE 20 MG/ML
INJECTION, SOLUTION EPIDURAL; INFILTRATION; INTRACAUDAL; PERINEURAL AS NEEDED
Status: DISCONTINUED | OUTPATIENT
Start: 2022-07-12 | End: 2022-07-12 | Stop reason: SURG

## 2022-07-12 RX ORDER — AMIODARONE HYDROCHLORIDE 200 MG/1
200 TABLET ORAL EVERY 12 HOURS SCHEDULED
Status: DISCONTINUED | OUTPATIENT
Start: 2022-07-12 | End: 2022-07-13 | Stop reason: HOSPADM

## 2022-07-12 RX ORDER — ASPIRIN 81 MG/1
81 TABLET, CHEWABLE ORAL DAILY
Status: DISCONTINUED | OUTPATIENT
Start: 2022-07-13 | End: 2022-07-13 | Stop reason: HOSPADM

## 2022-07-12 RX ORDER — HEPARIN SODIUM 200 [USP'U]/100ML
INJECTION, SOLUTION INTRAVENOUS AS NEEDED
Status: DISCONTINUED | OUTPATIENT
Start: 2022-07-12 | End: 2022-07-12 | Stop reason: HOSPADM

## 2022-07-12 RX ORDER — SODIUM CHLORIDE 0.9 % (FLUSH) 0.9 %
3 SYRINGE (ML) INJECTION EVERY 12 HOURS SCHEDULED
Status: DISCONTINUED | OUTPATIENT
Start: 2022-07-12 | End: 2022-07-13 | Stop reason: HOSPADM

## 2022-07-12 RX ORDER — SODIUM CHLORIDE 9 MG/ML
100 INJECTION, SOLUTION INTRAVENOUS CONTINUOUS
Status: DISCONTINUED | OUTPATIENT
Start: 2022-07-12 | End: 2022-07-12

## 2022-07-12 RX ORDER — ROCURONIUM BROMIDE 10 MG/ML
INJECTION, SOLUTION INTRAVENOUS AS NEEDED
Status: DISCONTINUED | OUTPATIENT
Start: 2022-07-12 | End: 2022-07-12 | Stop reason: SURG

## 2022-07-12 RX ORDER — SODIUM CHLORIDE, SODIUM LACTATE, POTASSIUM CHLORIDE, CALCIUM CHLORIDE 600; 310; 30; 20 MG/100ML; MG/100ML; MG/100ML; MG/100ML
100 INJECTION, SOLUTION INTRAVENOUS CONTINUOUS
Status: DISCONTINUED | OUTPATIENT
Start: 2022-07-12 | End: 2022-07-12

## 2022-07-12 RX ORDER — PROPOFOL 10 MG/ML
VIAL (ML) INTRAVENOUS AS NEEDED
Status: DISCONTINUED | OUTPATIENT
Start: 2022-07-12 | End: 2022-07-12 | Stop reason: SURG

## 2022-07-12 RX ORDER — ONDANSETRON 2 MG/ML
INJECTION INTRAMUSCULAR; INTRAVENOUS AS NEEDED
Status: DISCONTINUED | OUTPATIENT
Start: 2022-07-12 | End: 2022-07-12 | Stop reason: SURG

## 2022-07-12 RX ORDER — NALOXONE HCL 0.4 MG/ML
0.4 VIAL (ML) INJECTION AS NEEDED
Status: DISCONTINUED | OUTPATIENT
Start: 2022-07-12 | End: 2022-07-12 | Stop reason: HOSPADM

## 2022-07-12 RX ADMIN — Medication 2 G: at 09:35

## 2022-07-12 RX ADMIN — ROCURONIUM BROMIDE 50 MG: 50 INJECTION INTRAVENOUS at 09:33

## 2022-07-12 RX ADMIN — GLYCOPYRROLATE 0.4 MG: 0.2 INJECTION INTRAMUSCULAR; INTRAVENOUS at 10:38

## 2022-07-12 RX ADMIN — SODIUM CHLORIDE 100 ML/HR: 9 INJECTION, SOLUTION INTRAVENOUS at 16:46

## 2022-07-12 RX ADMIN — SODIUM CHLORIDE, POTASSIUM CHLORIDE, SODIUM LACTATE AND CALCIUM CHLORIDE: 600; 310; 30; 20 INJECTION, SOLUTION INTRAVENOUS at 09:24

## 2022-07-12 RX ADMIN — ASPIRIN 324 MG: 81 TABLET, CHEWABLE ORAL at 07:55

## 2022-07-12 RX ADMIN — EPHEDRINE SULFATE 10 MG: 50 INJECTION INTRAVENOUS at 09:40

## 2022-07-12 RX ADMIN — HEPARIN SODIUM 3000 UNITS: 1000 INJECTION, SOLUTION INTRAVENOUS; SUBCUTANEOUS at 09:58

## 2022-07-12 RX ADMIN — ACETAMINOPHEN 650 MG: 325 TABLET, FILM COATED ORAL at 21:16

## 2022-07-12 RX ADMIN — PROPOFOL 30 MG: 10 INJECTION, EMULSION INTRAVENOUS at 09:45

## 2022-07-12 RX ADMIN — AMIODARONE HYDROCHLORIDE 200 MG: 200 TABLET ORAL at 21:17

## 2022-07-12 RX ADMIN — HEPARIN SODIUM 3000 UNITS: 1000 INJECTION, SOLUTION INTRAVENOUS; SUBCUTANEOUS at 10:03

## 2022-07-12 RX ADMIN — ONDANSETRON 4 MG: 2 INJECTION INTRAMUSCULAR; INTRAVENOUS at 10:37

## 2022-07-12 RX ADMIN — DEXAMETHASONE SODIUM PHOSPHATE 4 MG: 4 INJECTION INTRA-ARTICULAR; INTRALESIONAL; INTRAMUSCULAR; INTRAVENOUS; SOFT TISSUE at 10:37

## 2022-07-12 RX ADMIN — EPHEDRINE SULFATE 10 MG: 50 INJECTION INTRAVENOUS at 10:30

## 2022-07-12 RX ADMIN — Medication: at 21:17

## 2022-07-12 RX ADMIN — LIDOCAINE HYDROCHLORIDE 100 MG: 20 INJECTION, SOLUTION EPIDURAL; INFILTRATION; INTRACAUDAL; PERINEURAL at 09:33

## 2022-07-12 RX ADMIN — APIXABAN 5 MG: 5 TABLET, FILM COATED ORAL at 21:17

## 2022-07-12 RX ADMIN — PROPOFOL 150 MG: 10 INJECTION, EMULSION INTRAVENOUS at 09:33

## 2022-07-12 NOTE — INTERVAL H&P NOTE
H&P reviewed. The patient was examined and there are no changes to the H&P.      I discussed left atrial appendage occlusion with Watchman device, the procedure, risks (including bleeding, infection, vascular damage [including minor oozing, bruising, bleeding, and up to and including the need for vascular surgery], pericardial effusion requiring pericardiocentesis contrast reaction, renal failure, respiratory failure, heart attack, stroke, arrhythmia and even death), benefits, and alternatives and the patient has voiced understanding and is willing to proceed.

## 2022-07-12 NOTE — ANESTHESIA POSTPROCEDURE EVALUATION
"Patient: Justine Bosch    Procedure Summary     Date: 07/12/22 Room / Location: P[facility] CATH LAB 1 / [facility] CATH INVASIVE LOCATION    Anesthesia Start: 0924 Anesthesia Stop: 1100    Procedure: Atrial Appendage Occlusion (Right ) Diagnosis: PAF (paroxysmal atrial fibrillation) ([facility], located within [facility])    Providers: Steve Lynn MD Provider: Yann Dennis CRNA    Anesthesia Type: general ASA Status: 3          Anesthesia Type: general    Vitals  No vitals data found for the desired time range.          Post Anesthesia Care and Evaluation    Patient location during evaluation: PHASE II  Patient participation: complete - patient participated  Level of consciousness: awake and alert  Pain management: adequate    Airway patency: patent  Anesthetic complications: No anesthetic complications    Cardiovascular status: acceptable  Respiratory status: acceptable  Hydration status: acceptable    Comments: Blood pressure 164/68, pulse (!) 49, temperature 97.2 °F (36.2 °C), temperature source Tympanic, resp. rate 19, height 157.5 cm (62\"), weight 72.6 kg (160 lb), SpO2 96 %.    Pt discharged from PACU based on mariola score >8      "

## 2022-07-12 NOTE — ANESTHESIA PROCEDURE NOTES
Airway  Urgency: elective    Date/Time: 7/12/2022 9:34 AM  Airway not difficult    General Information and Staff    Patient location during procedure: radiology  CRNA/CAA: Yann Dennis CRNA    Indications and Patient Condition  Indications for airway management: airway protection    Preoxygenated: yes  MILS maintained throughout  Mask difficulty assessment: 1 - vent by mask    Final Airway Details  Final airway type: endotracheal airway      Successful airway: ETT  Cuffed: yes   Successful intubation technique: direct laryngoscopy  Facilitating devices/methods: intubating stylet  Endotracheal tube insertion site: oral  Blade: Armas  Blade size: 2  ETT size (mm): 7.0  Cormack-Lehane Classification: grade I - full view of glottis  Placement verified by: chest auscultation and capnometry   Cuff volume (mL): 8  Measured from: lips  ETT/EBT  to lips (cm): 21  Number of attempts at approach: 1  Assessment: lips, teeth, and gum same as pre-op and atraumatic intubation    Additional Comments  ABRAM Scott intubated pt x1 attempt.

## 2022-07-12 NOTE — PLAN OF CARE
Goal Outcome Evaluation:  Plan of Care Reviewed With: patient        Progress: no change  Outcome Evaluation: Patient admitted from the [facility] after she had a watchman placed today. Right groin CDI, stitch has been cut. Sats WNL on room air. No c/o pain. Cont to monitor overnight and possible d/c in am if stable.

## 2022-07-12 NOTE — ANESTHESIA PROCEDURE NOTES
Arterial Line    Pre-sedation assessment completed: 7/12/2022 9:10 AM    Patient reassessed immediately prior to procedure    Patient location during procedure: pre-op  Start time: 7/12/2022 9:11 AM  Stop Time:7/12/2022 9:12 AM       Line placed for hemodynamic monitoring.  Performed By   Anesthesiologist: Kayleen Dhaliwal MD  Preanesthetic Checklist  Completed: patient identified, IV checked, site marked, risks and benefits discussed, surgical consent, monitors and equipment checked, pre-op evaluation and timeout performed  Arterial Line Prep   Sterile Tech: cap, gloves and mask  Prep: ChloraPrep  Patient monitoring: continuous pulse oximetry  Arterial Line Procedure   Laterality:right  Location:  radial artery  Catheter size: 20 G   Guidance: ultrasound guided  PROCEDURE NOTE/ULTRASOUND INTERPRETATION.  Using ultrasound guidance the potential vascular sites for insertion of the catheter were visualized to determine the patency of the vessel to be used for vascular access.  After selecting the appropriate site for insertion, the needle was visualized under ultrasound being inserted into the radial artery, followed by ultrasound confirmation of wire and catheter placement. There were no abnormalities seen on ultrasound; an image was taken; and the patient tolerated the procedure with no complications.   Number of attempts: 1  Successful placement: yes  Post Assessment   Dressing Type: secured with tape and wrist guard applied.   Complications no  Circ/Move/Sens Assessment: normal and unchanged.   Patient Tolerance: patient tolerated the procedure well with no apparent complications

## 2022-07-12 NOTE — ANESTHESIA PREPROCEDURE EVALUATION
Anesthesia Evaluation     history of anesthetic complications: prolonged sedation  NPO Solid Status: > 8 hours  NPO Liquid Status: > 8 hours           Airway   Mallampati: I  TM distance: >3 FB  Neck ROM: full  Dental    (+) edentulous    Pulmonary    (+) a smoker Former, COPD,   Cardiovascular   Exercise tolerance: good (4-7 METS)    Patient on routine beta blocker and Beta blocker given within 24 hours of surgery    (+) hypertension, dysrhythmias Atrial Fib, DVT, hyperlipidemia,       Neuro/Psych- negative ROS  GI/Hepatic/Renal/Endo    (+)   renal disease (hematuria, 12/2021) CRI,     Musculoskeletal (-) negative ROS    Abdominal    Substance History - negative use     OB/GYN negative ob/gyn ROS         Other      history of cancer (B cell lymphoma) active                      Anesthesia Plan    ASA 3     general     intravenous induction     Anesthetic plan, risks, benefits, and alternatives have been provided, discussed and informed consent has been obtained with: patient.        CODE STATUS:

## 2022-07-13 VITALS
HEIGHT: 62 IN | TEMPERATURE: 97.7 F | DIASTOLIC BLOOD PRESSURE: 45 MMHG | SYSTOLIC BLOOD PRESSURE: 115 MMHG | WEIGHT: 160.4 LBS | HEART RATE: 56 BPM | BODY MASS INDEX: 29.52 KG/M2 | OXYGEN SATURATION: 97 % | RESPIRATION RATE: 16 BRPM

## 2022-07-13 PROBLEM — Z95.818 PRESENCE OF WATCHMAN LEFT ATRIAL APPENDAGE CLOSURE DEVICE: Status: ACTIVE | Noted: 2022-07-13

## 2022-07-13 LAB
ANION GAP SERPL CALCULATED.3IONS-SCNC: 9 MMOL/L (ref 5–15)
BUN SERPL-MCNC: 20 MG/DL (ref 8–23)
BUN/CREAT SERPL: 15.6 (ref 7–25)
CALCIUM SPEC-SCNC: 9.6 MG/DL (ref 8.6–10.5)
CHLORIDE SERPL-SCNC: 104 MMOL/L (ref 98–107)
CO2 SERPL-SCNC: 27 MMOL/L (ref 22–29)
CREAT SERPL-MCNC: 1.28 MG/DL (ref 0.57–1)
DEPRECATED RDW RBC AUTO: 52.1 FL (ref 37–54)
EGFRCR SERPLBLD CKD-EPI 2021: 44.3 ML/MIN/1.73
ERYTHROCYTE [DISTWIDTH] IN BLOOD BY AUTOMATED COUNT: 16.3 % (ref 12.3–15.4)
GLUCOSE SERPL-MCNC: 134 MG/DL (ref 65–99)
HCT VFR BLD AUTO: 39.7 % (ref 34–46.6)
HGB BLD-MCNC: 12.1 G/DL (ref 12–15.9)
MCH RBC QN AUTO: 26.7 PG (ref 26.6–33)
MCHC RBC AUTO-ENTMCNC: 30.5 G/DL (ref 31.5–35.7)
MCV RBC AUTO: 87.4 FL (ref 79–97)
PLATELET # BLD AUTO: 180 10*3/MM3 (ref 140–450)
PMV BLD AUTO: 9.9 FL (ref 6–12)
POTASSIUM SERPL-SCNC: 4.3 MMOL/L (ref 3.5–5.2)
QT INTERVAL: 500 MS
QTC INTERVAL: 442 MS
RBC # BLD AUTO: 4.54 10*6/MM3 (ref 3.77–5.28)
SODIUM SERPL-SCNC: 140 MMOL/L (ref 136–145)
WBC NRBC COR # BLD: 8.62 10*3/MM3 (ref 3.4–10.8)

## 2022-07-13 PROCEDURE — 80048 BASIC METABOLIC PNL TOTAL CA: CPT | Performed by: INTERNAL MEDICINE

## 2022-07-13 PROCEDURE — 99239 HOSP IP/OBS DSCHRG MGMT >30: CPT | Performed by: INTERNAL MEDICINE

## 2022-07-13 PROCEDURE — 85027 COMPLETE CBC AUTOMATED: CPT | Performed by: INTERNAL MEDICINE

## 2022-07-13 RX ADMIN — ASPIRIN 81 MG: 81 TABLET, CHEWABLE ORAL at 08:06

## 2022-07-13 RX ADMIN — APIXABAN 5 MG: 5 TABLET, FILM COATED ORAL at 08:07

## 2022-07-13 RX ADMIN — Medication 3 ML: at 08:08

## 2022-07-13 RX ADMIN — AMIODARONE HYDROCHLORIDE 200 MG: 200 TABLET ORAL at 08:06

## 2022-07-13 NOTE — DISCHARGE SUMMARY
[facility] HEART GROUP DISCHARGE    Date of Discharge:  7/13/2022    Discharge Diagnosis:  1.  S/p Watchman implant  2.  Paroxysmal atrial fibrillation  3.  Hematuria  4.  Essential hypertension  5.  Lymphoma  6.  CKD, Stage 3    Presenting Problem/History of Present Illness  Patient was referred to [facility] by REGULO Guzmán, on 5/16/22, consideration of left atrial appendage occlusion.  She had recently been admitted to the hospital at [facility], after abrupt onset of palpitations and was found to be in atrial fibrillation with rapid ventricular response.  Documentation suggested she converted back to sinus rhythm on IV Cardizem.  She was given aspirin for CVA prophylaxis, due to chronic hematuria.  She was also given amiodarone to try to help maintain sinus rhythm.     He previously been on Imbruvica for B-cell lymphoma - started in Dec '21.  In Jan, got hematuria and dose of this was reduced.  Was hospitalized 4/25-4/27; took imbruvica that day. Also had colitis w/prior study drug. Has hemorrhoids - has streaks on toilet paper.      For the above reasons, she was felt to be a poor candidate for indefinite anticoagulation but reasonable for short-term anticoagulation, as ok'd by her hematologist/oncologist in [place] as well as in a 'shared decision making' visit here with Dr Chapin Torres.  MAURY performed by me on 6/1/22 showed her ДМИТРИЙ to be quite small, but felt to be reasonable for attempt at implant, so was scheduled for implant after proper consent was obtained.    Hospital Course  Patient underwent uncomplicated implant of 20mm Watchman FLX device, fully sealing off her ДМИТРИЙ.  She was started on eliquis the night of the procedure, with an uneventful overnight stay on telemetry.  She is being discharged home with plans for eliquis 5mg po bid along with ASA 81mg daily until a follow-up MAURY can be performed in ~6 weeks to assess for healing/endothelialization  of device.  If that looks as expected, then eliquis will be dc'ed and clopidogrel 75mg daily given x4.5 months (in addition to ASA), per usual post-Watchman protocol.    She was noted to remain in sinus bradycardia, with rates 41-55 bpm, throughout her stay, so I advised she discontinue the low dose metoprolol she'd been taking.      She has been on amiodarone and remaining in NSR.  As mentioned in my initial consultation note, now that she has a Watchman device to help with CVA prophylaxis, it may be reasonable for her to discontinue amiodarone, unless she were felt to be symptomatic from rate-controlled atrial fibrillation (which is a distinction yet to be made, since she initially presented with symptoms but was in atrial fibrillation with rapid ventricular response).  Ultimately, this decision (regarding antiarrhythmic therapy), will be made by her primary cardiologist, Dr. Knight, with whom I've emphasized to patient that she should make an appointment to be seen in 2-4 weeks.    Procedures Performed  Procedure(s):  Atrial Appendage Occlusion  07/12 1041 Note By: Steve Lynn MD    Consults:   Consults     No orders found for last 30 day(s).          Pertinent Test Results:   Basic Metabolic Panel    Sodium Sodium   Date Value Ref Range Status   07/13/2022 140 136 - 145 mmol/L Final   07/11/2022 143 136 - 145 mmol/L Final      Potassium Potassium   Date Value Ref Range Status   07/13/2022 4.3 3.5 - 5.2 mmol/L Final     Comment:     Slight hemolysis detected by analyzer. Results may be affected.   07/11/2022 4.2 3.5 - 5.2 mmol/L Final     Comment:     Slight hemolysis detected by analyzer. Results may be affected.      Chloride Chloride   Date Value Ref Range Status   07/13/2022 104 98 - 107 mmol/L Final   07/11/2022 102 98 - 107 mmol/L Final      Bicarbonate No results found for: PLASMABICARB   BUN BUN   Date Value Ref Range Status   07/13/2022 20 8 - 23 mg/dL Final   07/11/2022 27 (H) 8 - 23 mg/dL Final       Creatinine Creatinine   Date Value Ref Range Status   07/13/2022 1.28 (H) 0.57 - 1.00 mg/dL Final   07/11/2022 1.40 (H) 0.57 - 1.00 mg/dL Final      Calcium Calcium   Date Value Ref Range Status   07/13/2022 9.6 8.6 - 10.5 mg/dL Final   07/11/2022 9.8 8.6 - 10.5 mg/dL Final      Glucose      No components found for: GLUCOSE.*     WBC   Date Value Ref Range Status   07/13/2022 8.62 3.40 - 10.80 10*3/mm3 Final     RBC   Date Value Ref Range Status   07/13/2022 4.54 3.77 - 5.28 10*6/mm3 Final     Hemoglobin   Date Value Ref Range Status   07/13/2022 12.1 12.0 - 15.9 g/dL Final     Hematocrit   Date Value Ref Range Status   07/13/2022 39.7 34.0 - 46.6 % Final     MCV   Date Value Ref Range Status   07/13/2022 87.4 79.0 - 97.0 fL Final     MCH   Date Value Ref Range Status   07/13/2022 26.7 26.6 - 33.0 pg Final     MCHC   Date Value Ref Range Status   07/13/2022 30.5 (L) 31.5 - 35.7 g/dL Final     RDW   Date Value Ref Range Status   07/13/2022 16.3 (H) 12.3 - 15.4 % Final     RDW-SD   Date Value Ref Range Status   07/13/2022 52.1 37.0 - 54.0 fl Final     MPV   Date Value Ref Range Status   07/13/2022 9.9 6.0 - 12.0 fL Final     Platelets   Date Value Ref Range Status   07/13/2022 180 140 - 450 10*3/mm3 Final         Condition on Discharge:  stable    Physical Exam at Discharge    Vital Signs  Temp:  [97.2 °F (36.2 °C)-98 °F (36.7 °C)] 97.7 °F (36.5 °C)  Heart Rate:  [46-56] 56  Resp:  [12-18] 16  BP: (115-147)/(45-71) 115/45    Physical Exam:  Vitals and nursing note reviewed.   Constitutional:       General: Not in acute distress.     Appearance: Not in distress.   Neck:      Vascular: No JVD or JVR. JVD normal.   Pulmonary:      Effort: Pulmonary effort is normal.      Breath sounds: Normal breath sounds.   Cardiovascular:      Bradycardia present. Regular rhythm.      Murmurs: There is no murmur.      No gallop. No rub.   Pulses:     Intact distal pulses.   Edema:     Peripheral edema absent.   Skin:     General:  Skin is warm and dry.   Neurological:      Mental Status: Alert, oriented to person, place, and time and oriented to person, place and time.         Discharge Disposition  Home or Self Care    Discharge Medications     Discharge Medications      New Medications      Instructions Start Date   apixaban 5 MG tablet tablet  Commonly known as: ELIQUIS   5 mg, Oral, Every 12 Hours Scheduled         Continue These Medications      Instructions Start Date   acalabrutinib 100 MG capsule capsule  Commonly known as: CALQUENCE   100 mg, Oral, 2 Times Daily, Patient to start taking medication on Friday (7/15/22)      acetaminophen 500 MG tablet  Commonly known as: TYLENOL   500 mg, Oral, Every 6 Hours      amiodarone 200 MG tablet  Commonly known as: PACERONE   amiodarone 200 mg tablet   TAKE 1 TABLET BY MOUTH TWICE DAILY      aspirin 81 MG chewable tablet   aspirin 81 mg chewable tablet   Chew 1 tablet every day by oral route.      Cholecalciferol 50 MCG (2000 UT) capsule   Vitamin D3 50 mcg (2,000 unit) capsule   Take by oral route.      losartan-hydrochlorothiazide 50-12.5 MG per tablet  Commonly known as: HYZAAR   losartan 50 mg-hydrochlorothiazide 12.5 mg tablet   TAKE 1 TABLET BY MOUTH ONCE DAILY      potassium chloride 20 MEQ CR tablet  Commonly known as: K-DUR,KLOR-CON   potassium chloride ER 20 mEq tablet,extended release   Take 1 tablet every day by oral route.      Selenium Sulfide 2.25 % shampoo   selenium sulfide 2.25 % shampoo   APPLY TO WET SCALP ONCE WEEKLY, WORK INTO A FULL LATHER, LEAVE ON SCALP FOR 2-3 MINUTES, RINSE THOUROUGHLY, AND THEN PAT DRY         Stop These Medications    metoprolol succinate XL 25 MG 24 hr tablet  Commonly known as: TOPROL-XL            Discharge Diet: cardiac    Activity at Discharge:    No lifting greater than 5 pounds for 7 days (this is the weight of a gallon of milk)  No heavy or strenuous pushing or pulling.  No tub baths, hot tubs, swimming pools, or submerging groin underwater  for 1 week.  Wash groin site daily with antibacterial soap and water. Rinse and keep clean and dry.  No lotions, powders, or topical ointments to site. Keep open to air and dry.  Call our office with any concerns or if you notice redness, swelling, drainage, warmth, or pain at the site.      Follow-up Appointments  Dr. Knight as scheduled; if none, then in 2-4 weeks  Dr Mone Rodríguez in 1-2 weeks    F/u MAURY here with Dr Lynn in ~6 weeks  Additional Instructions for the Follow-ups that You Need to Schedule     COVID PRE-OP / PRE-PROCEDURE SCREENING ORDER (NO ISOLATION) - Swab, Nasopharynx    Aug 22, 2022 (Approximate)      Please select your location: [facility]    COVID Screening Order: In-House: APTIMA PANTHER, 24 HR TAT [[id number]]    Previously tested for COVID-19?: Yes    Employed in healthcare setting?: No    Symptomatic for COVID-19 as defined by CDC?: No    Hospitalized for COVID-19?: No    Admitted to ICU for COVID-19?: No    Resident in a congregate (group) care setting?: No    Pregnant?: No    Release to patient: Immediate               Test Results Pending at Discharge: none    35 minutes spent in discharge process on day of d/c       Steve Lynn MD  07/13/22  09:25 CDT

## 2022-07-13 NOTE — PAYOR COMM NOTE
"7/13/22 [facility] [phone]  [phone]      PATIENT ADMITTED TO INPATIENT ON 7/12/22 POST INPATIENT PROCEDURE. INPATIENT APPROVED.    AUTH # [id number]            Eric Vásquez (71 y.o. Female)             Date of Birth   1950    Social Security Number       Address   [address] [place] [address]    Home Phone   [phone]    MRN   [id number]       Restoration   Non-Orthodox    Marital Status   Single                            Admission Date   7/12/22    Admission Type   Elective    Admitting Provider   Steve Lynn MD    Attending Provider   Steve Lynn MD    Department, Room/Bed   [facility] 4B, 442/1       Discharge Date       Discharge Disposition       Discharge Destination                               Attending Provider: Steve Lynn MD    Allergies: No Known Allergies    Isolation: None   Infection: None   Code Status: CPR   Advance Care Planning Activity    Ht: 157.5 cm (62\")   Wt: 72.8 kg (160 lb 6.4 oz)    Admission Cmt: None   Principal Problem: PAF (paroxysmal atrial fibrillation) (HCC) [I48.0]                 Active Insurance as of 7/12/2022     Primary Coverage     Payor Plan Insurance Group Employer/Plan Group    HUMANA MEDICARE REPLACEMENT HUMANA MEDICARE REPLACEMENT [id number]     Payor Plan Address Payor Plan Phone Number Payor Plan Fax Number Effective Dates    [address] [address] [phone]  1/1/2019 - None Entered    [facility] [address]       Subscriber Name Subscriber Birth Date Member ID       ERIC VÁSQUEZ 1950 [id number]                 Emergency Contacts      (Rel.) Home Phone Work Phone Mobile Phone    ANITA OJEDA (Significant Other) [phone] -- [phone]    AZALIA VÁSQUEZ (Daughter) [phone] -- --                                  [facility] Encounter Date/Time: 7/12/2022 0720   Hospital Account: [id number]    MRN: [id number]   Patient:  Eric Vásquez   Contact " Serial #: [id number]   SSN:          ENCOUNTER             Patient Class: Inpatient   Unit: [facility] 4B   Hospital Service: Cardiology     Bed: 442/1   Admitting Provider: Steve Lynn MD   Referring Physician: Steve Lynn   Attending Provider: Steve Lynn MD   Adm Diagnosis: PAF (paroxysmal atrial f*               PATIENT          Name: Justine Vásquez : 1950 (71 yrs)   Address: [address] [place] [address] Sex: Female   City: [place] [address]   County: [place]   Marital Status: SINGLE Ethnicity: NOT                                                                              Race: WHITE   Primary Care Provider: Mone Rodríguez MD Patients Phone: Home Phone: [phone]     Mobile Phone: [phone]   EMERGENCY CONTACT   Contact Name Legal Guardian? Relationship to Patient Home Phone Work Phone   1. ANITA OJEDA  2. THALIAAZALIA      Significant Other  Daughter [phone]           GUARANTOR            Guarantor: Justine Vásquez     : 1950   Address: [address] [place] [address] Sex: [place]     [place] [address]     Relation to Patient: Self       Home Phone: [phone]   Guarantor ID: [id number]       Work Phone:     GUARANTOR EMPLOYER   Employer:           Status: RETIRED   COVERAGE          PRIMARY INSURANCE   Payor: HUMANA MEDICARE REPLACEMENT Plan: HUMANA MEDICARE REPLACEMENT   Group Number: [id number] Insurance Type: INDEMNITY   Subscriber Name: JUSTINE VÁSQUEZ Subscriber : 1950   Subscriber ID: [id number] Coverage Address: [address] [address]   Pat. Rel. to Subscriber: Self Coverage Phone: [phone]   SECONDARY INSURANCE   Payor: N/A Plan: N/A   Group Number:   Insurance Type:     Subscriber Name:   Subscriber :     Subscriber ID:   Coverage Address:     Pat. Rel. to Subscriber:   Coverage Phone:        Contact Serial # ([id number])         2022    Chart ID ([id number])                                      RADHA  "[facility] CARDIOLOGY  [address] [place] [id number] [phone]          Justine Bosch  Intra-Operative Transesophageal Echocardiogram  Order# [id number]  Reading physician: Gerald Arshad MD Ordering physician: Steve Lynn MD Study date: 22       Patient Information    Patient Name   Justine Bosch MRN   [id number] Legal Sex   Female  (Age)   1950 (71 y.o.)       Admission Information    Admission Date/Time Discharge Date/Time Room/Bed   22  07:20 AM  442/1        ISCV PACS Images     Show images for Intra-Op MAURY       Sedation Narrator Report    Sedation Narrator Report       Interpretation Summary    · Successful placement of 20 mm Watchman FLX device.           Patient Hx Of Height, Weight, and Vitals    Height Weight BSA (Calculated - sq m) BMI (Calculated) Retired BMI (kg/m2) Pulse BP   157.5 cm (62\") 72.6 kg (160 lb) 1.74 sq meters 29.3  46 136/62         Clinical Indication    Guidance for Cardiac Intervention   Dx: Paroxysmal atrial fibrillation (HCC) [I48.0 (ICD-10-CM)]       Cardiac History    Diagnosis Date Comment Source   Arrhythmia      Hyperlipidemia      Hypertension          Additional Study Details    A transesophageal echocardiogram was performed for guidance of a transcatheter intracardiac or great vessel(s): ДМИТРИЙ Occlusion/Closure.The study is technically adequate for diagnosis.     Informed consent for Transesophageal Echocardiogram, and use of contrast as needed, was obtained prior to the procedure. The procedure was performed in the cath lab. Patient was noted to be in a fasting state, with a peripheral IV in place.   A bite block was placed for probe protection. Anesthesia monitored sedation was administered by anesthesia staff, Yann Dennis CRNA. Please see notes for documentation. The following medication(s) were administered during the procedure:         180 mg of Propofol.   An adult multi-frequency, multiplane transesophageal " echocardiographic endoscope was inserted and manipulated in the standard fashion to achieve multiplane views. Transesophageal probe was able to be passed without difficulty.  Usual basal, mid-esophageal, transgastric, and aortic views were obtained. The patient's vital signs, including blood pressure, heart rate, pulse oximetry, and cardiac rhythm were monitored throughout the procedure. Vitals signs remained stable throughout the study. Post-procedural recovery was completed in the Cardio-Vascular/Close Observation Unit, The patient tolerated the procedure without evidence of oropharyngeal or esophageal trauma.       Echocardiogram Findings    Left Ventricle Left ventricular systolic function is normal.   Normal left ventricular cavity size noted. All left ventricular wall segments contract normally.   Left Atrium The left atrial cavity is dilated. There is no spontaneous echo contrast present. Left atrial appendage was found to be multilobar in nature.  Left atrial appendage morphology best described as hammerhead. The left atrial appendage was visualized through multiple planes. No evidence of a left atrial appendage thrombus was present.  Successful placement of 20 mm Watchman FLX device.   Aortic Valve The aortic valve is grossly normal in structure. The aortic valve appears trileaflet.   Mitral Valve The mitral valve is grossly normal in structure.   Pericardium There is a trivial pericardial effusion.         Yann Dennis CRNA    Nurse Anesthetist   Specialty:  Certified Registered Nurse Anesthetist   Anesthesia Postprocedure Evaluation      Signed   Date of Service:  07/12/22 1102   Creation Time:  07/12/22 1102              Signed                 Show:Clear all  [x]Manual[x]Template[]Copied    Added by:  [x]Yann Dennis CRNA      []Sherrill for details    Patient: Justine Bosch          Procedure Summary      Date: 07/12/22 Room / Location: PAD CATH LAB  /  PAD CATH INVASIVE LOCATION      "Anesthesia Start: 0924 Anesthesia Stop: 1100     Procedure: Atrial Appendage Occlusion (Right ) Diagnosis: PAF (paroxysmal atrial fibrillation) ([facility])     Providers: Steve Lynn MD Provider: Yann Dennis CRNA     Anesthesia Type: general ASA Status: 3            Anesthesia Type: general     Vitals  No vitals data found for the desired time range.              Post Anesthesia Care and Evaluation     Patient location during evaluation: PHASE II  Patient participation: complete - patient participated  Level of consciousness: awake and alert  Pain management: adequate    Airway patency: patent  Anesthetic complications: No anesthetic complications    Cardiovascular status: acceptable  Respiratory status: acceptable  Hydration status: acceptable    Comments: Blood pressure 164/68, pulse (!) 49, temperature 97.2 °F (36.2 °C), temperature source Tympanic, resp. rate 19, height 157.5 cm (62\"), weight 72.6 kg (160 lb), SpO2 96 %.    Pt discharged from PACU based on mariola score >8               Signed                 Show:Clear all  []Manual[x]Template[]Copied    Added by:  [x]Kirsten Hargrove RN      []Sherrill for details    Goal Outcome Evaluation:  Plan of Care Reviewed With: patient  Progress: no change  Outcome Evaluation: Patient admitted from the [facility] after she had a watchman placed today. Right groin CDI, stitch has been cut. Sats WNL on room air. No c/o pain. Cont to monitor overnight and possible d/c in am if stable.           Goal Outcome Evaluation:  Plan of Care Reviewed With: patient  Progress: no change  Outcome Evaluation: VSS. C/o mild back pain x 1 relieved by Tylenol. Did have some issues with R groin site oozing some at beginning of shift but has since resolved and no new oozing has been noted. Groin soft to touch. SB 47-58 on tele. Should go home later today.              Current Facility-Administered Medications   Medication Dose Route Frequency Provider Last Rate Last Admin   • acetaminophen " (TYLENOL) tablet 650 mg  650 mg Oral Q4H PRN Steve Lynn MD   650 mg at 07/12/22 2116   • amiodarone (PACERONE) tablet 200 mg  200 mg Oral Q12H Steve Lynn MD   200 mg at 07/12/22 2117   • apixaban (ELIQUIS) tablet 5 mg  5 mg Oral Q12H Steve Lynn MD   5 mg at 07/12/22 2117   • aspirin chewable tablet 81 mg  81 mg Oral Daily Steve Lynn MD       • lidocaine PF 1% (XYLOCAINE) injection 0.5 mL  0.5 mL Injection Once PRN Kayleen Dhaliwal MD       • sodium chloride 0.9 % flush 3 mL  3 mL Intravenous Q12H Kayleen Dhaliwal MD   Given at 07/12/22 2117   • sodium chloride 0.9 % flush 3-10 mL  3-10 mL Intravenous PRN Kayleen Dhaliwal MD

## 2022-07-13 NOTE — CASE MANAGEMENT/SOCIAL WORK
Discharge Planning Assessment  [facility]     Patient Name: Justine Bosch  MRN: [id number]  Today's Date: 7/13/2022    Admit Date: 7/12/2022     Discharge Needs Assessment     Row Name 07/13/22 1000       Living Environment    People in Home significant other    Name(s) of People in Home Cas    Current Living Arrangements home    Primary Care Provided by spouse/significant other    Family Caregiver if Needed significant other    Family Caregiver Names Cas    Able to Return to Prior Arrangements yes       Resource/Environmental Concerns    Resource/Environmental Concerns none       Transition Planning    Patient/Family Anticipated Services at Transition none    Transportation Anticipated family or friend will provide       Discharge Needs Assessment    Readmission Within the Last 30 Days no previous admission in last 30 days    Equipment Currently Used at Home none    Concerns to be Addressed no discharge needs identified    Discharge Coordination/Progress spoke to patient who lives with significant other Cas; has RX coverage and PCP; independent at home prior to illness will follow for DC needs               Discharge Plan    No documentation.               Continued Care and Services - Admitted Since 7/12/2022    Coordination has not been started for this encounter.       Expected Discharge Date and Time     Expected Discharge Date Expected Discharge Time    Jul 13, 2022          Demographic Summary    No documentation.                Functional Status    No documentation.                Psychosocial    No documentation.                Abuse/Neglect    No documentation.                Legal    No documentation.                Substance Abuse    No documentation.                Patient Forms    No documentation.                   Dori Russell RN

## 2022-07-13 NOTE — PLAN OF CARE
Goal Outcome Evaluation:  Plan of Care Reviewed With: patient        Progress: no change  Outcome Evaluation: VSS. C/o mild back pain x 1 relieved by Tylenol. Did have some issues with R groin site oozing some at beginning of shift but has since resolved and no new oozing has been noted. Groin soft to touch. SB 47-58 on tele. Should go home later today.

## 2022-07-14 ENCOUNTER — READMISSION MANAGEMENT (OUTPATIENT)
Dept: CALL CENTER | Facility: HOSPITAL | Age: 72
End: 2022-07-14

## 2022-07-14 NOTE — OUTREACH NOTE
Prep Survey    Flowsheet Row Responses   [facility] facility patient discharged from? [place]   Is LACE score < 7 ? No   Emergency Room discharge w/ pulse ox? No   Eligibility Readm Mgmt   Discharge diagnosis Watchman implant   Does the patient have one of the following disease processes/diagnoses(primary or secondary)? General Surgery   Does the patient have Home health ordered? No   Is there a DME ordered? No   Prep survey completed? Yes          ADÁN RHOADES - Registered Nurse

## 2022-07-18 ENCOUNTER — READMISSION MANAGEMENT (OUTPATIENT)
Dept: CALL CENTER | Facility: HOSPITAL | Age: 72
End: 2022-07-18

## 2022-07-18 NOTE — OUTREACH NOTE
General Surgery Week 1 Survey    Flowsheet Row Responses   [facility] patient discharged from? [place]   Does the patient have one of the following disease processes/diagnoses(primary or secondary)? General Surgery   Week 1 attempt successful? Yes   Call start time 1934   Call end time 1939   Discharge diagnosis Watchman implant   Meds reviewed with patient/caregiver? Yes   Is the patient having any side effects they believe may be caused by any medication additions or changes? No   Does the patient have all medications related to this admission filled (includes all antibiotics, pain medications, etc.) Yes   Is the patient taking all medications as directed (includes completed medication regime)? Yes   Medication comments States she stopped the amiodarone and started back on the metoprolol by her cardiologist.    Does the patient have a follow up appointment scheduled with their surgeon? Yes   Has the patient kept scheduled appointments due by today? N/A   Comments Has appt with PCP on 7/28, cardiology on 8/15 and Bashir with oncology 8/4.   Psychosocial issues? No   Did the patient receive a copy of their discharge instructions? Yes   Nursing interventions Reviewed instructions with patient   What is the patient's perception of their health status since discharge? Improving   Nursing interventions Nurse provided patient education   Is the patient/caregiver able to teach back signs and symptoms of incisional infection? Increased redness, swelling or pain at the incisonal site, Pus or odor from incision, Increased drainage or bleeding, Incisional warmth, Fever   Is the patient/caregiver able to teach back steps to recovery at home? Set small, achievable goals for return to baseline health, Rest and rebuild strength, gradually increase activity, Make a list of questions for surgeon's appointment   If the patient is a current smoker, are they able to teach back resources for cessation? Not a smoker   Is the  "patient/caregiver able to teach back the hierarchy of who to call/visit for symptoms/problems? PCP, Specialist, Home health nurse, Urgent Care, ED, 911 Yes   Additional teach back comments States she is doing \"pretty good\".  Site has some slight bloody drainage.  That does not cover bandaid area and very small amount.  Small harden area the size of a binh but not pain.  Advised to monitor and if it changes to contact cardiolgoist.     Week 1 call completed? Yes   Wrap up additional comments Pt to [facility] and if any changes to contact her cardiologist.          MARY DAVID - Licensed Nurse  "

## 2022-08-22 ENCOUNTER — LAB (OUTPATIENT)
Dept: LAB | Facility: HOSPITAL | Age: 72
End: 2022-08-22

## 2022-08-22 DIAGNOSIS — Z95.818 PRESENCE OF WATCHMAN LEFT ATRIAL APPENDAGE CLOSURE DEVICE: ICD-10-CM

## 2022-08-22 DIAGNOSIS — I48.0 PAROXYSMAL ATRIAL FIBRILLATION: ICD-10-CM

## 2022-08-22 LAB — SARS-COV-2 ORF1AB RESP QL NAA+PROBE: NOT DETECTED

## 2022-08-22 PROCEDURE — U0004 COV-19 TEST NON-CDC HGH THRU: HCPCS

## 2022-08-22 PROCEDURE — C9803 HOPD COVID-19 SPEC COLLECT: HCPCS

## 2022-08-24 ENCOUNTER — HOSPITAL ENCOUNTER (OUTPATIENT)
Dept: CARDIOLOGY | Facility: HOSPITAL | Age: 72
Discharge: HOME | End: 2022-08-24

## 2022-08-24 ENCOUNTER — ANESTHESIA EVENT (OUTPATIENT)
Dept: CARDIOLOGY | Facility: HOSPITAL | Age: 72
End: 2022-08-24

## 2022-08-24 ENCOUNTER — ANESTHESIA (OUTPATIENT)
Dept: CARDIOLOGY | Facility: HOSPITAL | Age: 72
End: 2022-08-24

## 2022-08-24 ENCOUNTER — TELEPHONE (OUTPATIENT)
Dept: CARDIOLOGY | Facility: CLINIC | Age: 72
End: 2022-08-24

## 2022-08-24 VITALS
BODY MASS INDEX: 29.33 KG/M2 | OXYGEN SATURATION: 100 % | WEIGHT: 159.4 LBS | DIASTOLIC BLOOD PRESSURE: 49 MMHG | HEART RATE: 55 BPM | HEIGHT: 62 IN | RESPIRATION RATE: 18 BRPM | SYSTOLIC BLOOD PRESSURE: 115 MMHG | TEMPERATURE: 96.8 F

## 2022-08-24 DIAGNOSIS — Z95.818 PRESENCE OF WATCHMAN LEFT ATRIAL APPENDAGE CLOSURE DEVICE: ICD-10-CM

## 2022-08-24 DIAGNOSIS — I48.0 PAROXYSMAL ATRIAL FIBRILLATION: ICD-10-CM

## 2022-08-24 PROCEDURE — 25010000002 PROPOFOL 10 MG/ML EMULSION: Performed by: NURSE ANESTHETIST, CERTIFIED REGISTERED

## 2022-08-24 PROCEDURE — 93325 DOPPLER ECHO COLOR FLOW MAPG: CPT

## 2022-08-24 PROCEDURE — 93312 ECHO TRANSESOPHAGEAL: CPT | Performed by: INTERNAL MEDICINE

## 2022-08-24 PROCEDURE — 93325 DOPPLER ECHO COLOR FLOW MAPG: CPT | Performed by: INTERNAL MEDICINE

## 2022-08-24 PROCEDURE — 93312 ECHO TRANSESOPHAGEAL: CPT

## 2022-08-24 RX ORDER — SODIUM CHLORIDE 0.9 % (FLUSH) 0.9 %
10 SYRINGE (ML) INJECTION AS NEEDED
Status: CANCELLED | OUTPATIENT
Start: 2022-08-24

## 2022-08-24 RX ORDER — SODIUM CHLORIDE 9 MG/ML
100 INJECTION, SOLUTION INTRAVENOUS CONTINUOUS
Status: CANCELLED | OUTPATIENT
Start: 2022-08-24

## 2022-08-24 RX ORDER — CLOPIDOGREL BISULFATE 75 MG/1
75 TABLET ORAL DAILY
Qty: 135 TABLET | Refills: 0 | Status: SHIPPED | OUTPATIENT
Start: 2022-08-24 | End: 2023-01-12

## 2022-08-24 RX ORDER — SODIUM CHLORIDE 0.9 % (FLUSH) 0.9 %
10 SYRINGE (ML) INJECTION EVERY 12 HOURS SCHEDULED
Status: CANCELLED | OUTPATIENT
Start: 2022-08-24

## 2022-08-24 RX ORDER — LIDOCAINE HYDROCHLORIDE 20 MG/ML
INJECTION, SOLUTION EPIDURAL; INFILTRATION; INTRACAUDAL; PERINEURAL AS NEEDED
Status: DISCONTINUED | OUTPATIENT
Start: 2022-08-24 | End: 2022-08-24 | Stop reason: SURG

## 2022-08-24 RX ORDER — METOPROLOL SUCCINATE 25 MG/1
25 TABLET, EXTENDED RELEASE ORAL DAILY
COMMUNITY

## 2022-08-24 RX ORDER — PROPOFOL 10 MG/ML
VIAL (ML) INTRAVENOUS AS NEEDED
Status: DISCONTINUED | OUTPATIENT
Start: 2022-08-24 | End: 2022-08-24 | Stop reason: SURG

## 2022-08-24 RX ADMIN — PROPOFOL 30 MG: 10 INJECTION, EMULSION INTRAVENOUS at 09:58

## 2022-08-24 RX ADMIN — PROPOFOL 70 MG: 10 INJECTION, EMULSION INTRAVENOUS at 09:55

## 2022-08-24 RX ADMIN — LIDOCAINE HYDROCHLORIDE 100 MG: 20 INJECTION, SOLUTION EPIDURAL; INFILTRATION; INTRACAUDAL; PERINEURAL at 09:55

## 2022-08-24 NOTE — ANESTHESIA POSTPROCEDURE EVALUATION
Patient: Justine Bosch    Procedure Summary     Date: 08/24/22 Room / Location: [facility] CATH LAB; [facility] CARDIOLOGY    Anesthesia Start: 0936 Anesthesia Stop: 1003    Procedure: ADULT TRANSESOPHAGEAL ECHO (MAURY) W/ CONT IF NECESSARY PER PROTOCOL Diagnosis:       Paroxysmal atrial fibrillation (HCC)      Presence of Watchman left atrial appendage closure device      (Guidance for Cardiac Intervention)      (Re-evaluation of Prior MAURY for Interval Change)      (Closure Device)    Scheduled Providers: Steve Lynn MD Provider: Curry Lara CRNA    Anesthesia Type: MAC ASA Status: 3          Anesthesia Type: MAC    Vitals  Vitals Value Taken Time   /49 08/24/22 1046   Temp     Pulse 53 08/24/22 1054   Resp 17 08/24/22 1030   SpO2 99 % 08/24/22 1054   Vitals shown include unvalidated device data.        Post Anesthesia Care and Evaluation    Patient location during evaluation: PHASE II  Patient participation: complete - patient participated  Level of consciousness: awake and alert  Pain management: adequate    Airway patency: patent  Anesthetic complications: No anesthetic complications  PONV Status: none  Cardiovascular status: acceptable and stable  Respiratory status: acceptable and unassisted  Hydration status: acceptable

## 2022-08-24 NOTE — ANESTHESIA PREPROCEDURE EVALUATION
Anesthesia Evaluation     history of anesthetic complications: prolonged sedation  NPO Solid Status: > 8 hours  NPO Liquid Status: > 8 hours           Airway   Mallampati: I  TM distance: >3 FB  Neck ROM: full  Dental    (+) edentulous    Pulmonary    (+) a smoker Former, COPD,   Cardiovascular   Exercise tolerance: good (4-7 METS)    Patient on routine beta blocker and Beta blocker given within 24 hours of surgery    (+) hypertension, dysrhythmias Atrial Fib, DVT, hyperlipidemia,       Neuro/Psych- negative ROS  GI/Hepatic/Renal/Endo    (+)   renal disease (hematuria, 12/2021) CRI,     Musculoskeletal (-) negative ROS    Abdominal    Substance History - negative use     OB/GYN negative ob/gyn ROS         Other      history of cancer (B cell lymphoma) active                      Anesthesia Plan    ASA 3     MAC     intravenous induction     Anesthetic plan, risks, benefits, and alternatives have been provided, discussed and informed consent has been obtained with: patient.        CODE STATUS:

## 2022-08-24 NOTE — TELEPHONE ENCOUNTER
Call to pt as requested by Dr. Lynn. He wanted to let her know that her MAURY looked great today. She needs to follow up with Dr. Knight as scheduled and she will only need to follow up here in [place] in 1 year.    She verbalized understanding.

## 2022-08-28 LAB
MAXIMAL PREDICTED HEART RATE: 147 BPM
STRESS TARGET HR: 125 BPM

## 2023-01-12 ENCOUNTER — TELEPHONE (OUTPATIENT)
Dept: CARDIOLOGY | Facility: CLINIC | Age: 73
End: 2023-01-12
Payer: MEDICARE

## 2023-01-12 NOTE — TELEPHONE ENCOUNTER
Call to pt to let her know that she can stop taking Plavix now that she is 6 months post Watchman. She was instructed to continue her baby ASA daily. She verbalized understanding.

## 2023-02-20 DIAGNOSIS — C88.4 EXTRANODAL MARGINAL ZONE B-CELL LYMPHOMA (HCC): Primary | ICD-10-CM

## 2023-02-21 NOTE — PROGRESS NOTES
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  Recurrence paraspinal mass close to her right kidney, 2010  Recurrence with a mediastinal mass, 2013  Recurrence was consistent with marginal zone lymphoma, Oct 2018    Treatment Summary  2006 Resection of the RLL and RML  2006 Radiation 3000 cGy  2013 Rituxan and bendamustine x 2 cycles and then switched to R-CVP x 4 cycles due to cytopenias followed by Rituxan maintenance for one year  2018 -04/2019 Obinutuzumab  6/26/19-2/24/21 ME-401+ rituximab (CR, stopped due to diarrhea/colitis)  11/2021-4/25/22 Ibrutinib (discontinued due to recurrent hematuria, hypokalemia and AFRVR)  07/2022 - Initiated Acalabrutinib 100 mg BID    Cancer History  Mrs. Randy Vizcarra is a very pleasant 74 y.o. patient who reports to clinic for a second opinon regarding her diagnosis of lymphoma. She was initially diagnosed with with extranodal marginal zone lymphoma of mucosa associalted lymphoid tissue (MALT) of the right lower lung in 2006. Her pathology was reviewed at [facility] and was consistent with small B-cell lymphoma. She had resection of the RLL and RML followed by radiation 3000 cGy. She had a recurrence in 2010 with a paraspinal mass that was close to her right kidney. She received radiation to that area by Dr. Ty Hinojosa. She had recurrence in 2013 with a mediastinal mass. This mass was not biopsied at the time. Carlitos Martínez She was treated with Rituxan and bendamustine x 2 cycles and then switched to R-CVP x 4 cycles due to cytopenias followed by Rituxan maintenance for one year.  She transfered care from Dr. Ruslan Lancaster to Dr. Naveed Malagon . CT C/A/P 6/2018 showed a new ill defined infiltratin L renal lower pole mass that was not seen on her previous scan. Of note scans also show previous RML and RLL resection, and atrophic R kidney (presumed to be secondary to previous RT) and a stable 5cm left renal cyst. Repeat CT abdomen 8/23/2018 showed this infiltrative process in the left renal sinus fat that was felt to be most consistent with her history of lymphoma. Biopsy of this mass 9/14/2018 (FNA with core) showed atypical lymphoid infiltrate. Pt had a repeat renal biopsy in 10/2018 and this was consistent with marginal zone lymphoma. We recommended therapy with obinutuzumab. She tolerated this well and completed this treatment in 04/2019.   2006 Resection of the RLL and RML  2006 Marginal zone lymphoma, extranodal, right lower lung, consistent with small B-cell lymphoma  2006 Radiation 3000 cGy  2010 Recurrence paraspinal mass close to her right kidney  2013 Rituxan and bendamustine x 2 cycles and then switched to R-CVP x 4 cycles due to cytopenias followed by Rituxan maintenance for one year  8/23/18 CT chest abdomen pelvis w/o [facility]): There is an enhancing infiltrative process involving the left renal sinus fat and perirenal fat as described above. This is most consistent with lymphoma. Urothelial carcinoma is much less likely possibility. There is a large cyst in the mid to upper left kidney. The right kidney is scarred and atrophic. Coronary artery disease and aortoiliac atherosclerosis. Status post prior right middle and lower lobectomies. There is associated volume loss and scarring in the right hemithorax. Severe hepatic steatosis. The bones are osteoporotic. 2018 - 04/2019 Obinutuzumab  6/26/19 CT chest abdomen pelvis w/o [facility]): Postoperative changes in the right hemithorax.  Several tiny clustered micronodules in the left lower lobe likely consistent with mucous plugging/bronchiolitis. Infiltrative mass involving the left kidney extending to the retroperitoneal space including the Gerota's fascia and proximal aspect of the left ureter is grossly unchanged. There is mild unchanged left hydronephrosis. Left renal cyst. Advanced atrophy of the right kidney. Diffuse hepatic steatosis. Colonic diverticulosis. Generalized osteoporosis. Left hip prosthesis. Advanced osteoarthritis of the right hip. 7/8/19 PET scan [facility]):  Intensely FDG avid no soft tissue lesion within the lower pole the left kidney extending into the medial retroperitoneal space left ureter. This lesion is biopsy proven lymphoma. Recommend continued close follow-up. Unchanged left renal cyst and chronic hydronephrosis on the left. 9/5/19 CT chest abdomen pelvis w/o ([facility]): Interval decrease in size of the ill-defined soft tissue along the medial lower pole of the left kidney since the 7/8/19 PET/CT. Stable left-sided pelvocaliectasis. No new sites of lymphoma are identified. Markedly atrophic right kidney. Prior right middle and lower lobectomies. Persistent, severe hepatic steatosis. Marked atherosclerotic calcification of the aorta and iliac   arteries. Left total hip prosthesis. 12/26/19 CT chest abdomen pelvis w/o ([facility]): Robinson Mering Similar size of the ill-defined soft tissue along the medial lower pole of the left kidney compared with the 9/5/2019 CT. Stable mild left pelvocaliectasis. No new sites of lymphoma identified. New mild groundglass opacities in the left lung base, likely of inflammatory/infectious etiology. Marked atrophy of the right kidney. Prior right middle and lower lobectomy. Persistent hepatic steatosis. Diffuse atherosclerosis. Left hip arthroplasty. 6/11/20 CT chest abdomen pelvis w/o [facility]): Similar size of ill-defined soft tissue along the medial lower pole of the left kidney compared with 12/26/2019. Stable mild left pelvocaliectasis. No new sites of lymphoma identified.  Interval resolution of groundglass opacities at the left lung base. Marked atrophy of the right kidney. Prior right middle and lower lobectomy. Persistent hepatic steatosis. Diffuse atherosclerosis. Left hip arthroplasty. 11/25/20 CT chest, abdomen, pelvis w/o ([facility]): Compared to 6/11/2020, similar size of the ill-defined soft tissue along the medial lower pole of the left kidney. Similar mild left pelvocaliectasis. No new sites of lymphoma identified. Markedly decreased hepatic steatosis. Atrophic right kidney. Diffuse atherosclerosis. Severe right hip degenerative change. 6/26/19-2/24/21 ME-401+ rituximab (CR, stopped due to diarrhea/colitis)  11/4/21 CT chest, abdomen, pelvis w/o ([facility]): Interval enlargement of lymphomatous infiltration along the lower pole of the left kidney and proximal left ureter. This is associated with worsening of now mild left-sided hydronephrosis in this patient with a markedly atrophic right kidney. No new sites of lymphoma are identified in the chest, abdomen or pelvis. Postsurgical changes related to right middle and lower lobectomy. 1/31/22 CT chest, abdomen, pelvis w/o ([facility]): Decreased soft tissue in the region of biopsy-proven lymphomatous involvement of the left ureteropelvic junction. Decreased mild residual left lower pole hydronephrosis. No new sites of lymphoma in the chest, abdomen, or pelvis. Right middle and lower lung lobectomy  3/1/22 CT urogram ([facility]):  As compared with 1/31/2022, there has been probable slight enlargement in the infiltrative hypoenhancing soft tissue mass centered on the left renal hilum with extension along the UPJ/proximal left ureter with slightly increased left lower pole hydronephrosis. Note that direct comparison is slightly limited in the absence of intravenous contrast on prior examinations. No other etiology for patient's reported hematuria is identified.  No renal calculi or other suspicious renal or visible upper or lower tract urothelial lesion on slightly limited assessment. Endometrial thickening to 1.4 cm, greater than expected for a post menopausal patient. Confirmation with pelvic ultrasound is recommended. 11/2021-4/25/22 Ibrutinib (discontinued due to recurrent hematuria, hypokalemia and AFRVR)  5/31/22 Echo [facility]): Left ventricular systolic function is normal. Normal size and function of the right ventricle. No significant valvular pathology. Dilated left atrium. Small left atrial appendage, with apparent \"chicken wing\" anatomy. Left atrial appendage ostial width and coaxial depth measurements provided below; appears borderline for successful implant of 20 mm Watchman FLX device. No evidence of thrombus. Small (6 mm) atrial septal defect noted near inferior-posterior aspect of interatrial septum, with left-to-right flow observed on color Doppler. 7/7/22 CT chest, abdomen, pelvis w/o ([facility]):  Soft tissue mass at the left renal hilum with extension along the proximal left ureter has not changed appreciably since 3/1/2022, although evaluation is somewhat limited due to lack of intravenous contrast. As mentioned on the report for the 3/1/2022 CT, this mass showed probable enlargement when compared to the 1/31/2022 CT. Prior right middle and lower lobectomies. Mildly dilated main pulmonary artery, which may be due to pulmonary arterial hypertension. No new sites of lymphoma in the chest, abdomen or pelvis. Multiple other chronic and incidental findings as above.   07/2022 - Initiated Acalabrutinib 100 mg BID  8/24/22 Echo [facility]): Left ventricular systolic function is normal.   11/11/22 CT chest, abdomen, pelvis w/o ([facility]): The soft tissue material within the region of the left renal sinus has decreased in size. The soft tissue attenuating material surrounding the proximal ureter has decreased in size. No pathologically enlarged lymph nodes within the chest abdomen or pelvis.    2/23/2023-continue acalabrutinib 100 mg p.o. twice daily until disease progression or intolerable toxicity. Past Medical History:    Past Medical History:   Diagnosis Date    Cancer [facility])        Past Surgical History:    Past Surgical History:   Procedure Laterality Date    CATARACT EXTRACTION, BILATERAL Bilateral     JOINT REPLACEMENT Left     LUNG REMOVAL, PARTIAL Right        Social History:    Marital status:   Smoking status: Formerly;1 pack daily; [id number]  ETOH status: Occasionally  Resides: [place]    Family History:   Family History   Problem Relation Age of Onset    Cancer Mother         lymphoma of glands    Cancer Daughter         Wilms       Current Hospital Medications:    Current Outpatient Medications   Medication Sig Dispense Refill    aspirin 81 MG chewable tablet daily      Coenzyme Q10 50 MG CAPS Take 200 mg by mouth daily      Cholecalciferol 50 MCG (2000 UT) CAPS Vitamin D3 50 mcg (2,000 unit) capsule   Take by oral route. ferrous sulfate (FE TABS 325) 325 (65 Fe) MG EC tablet Take 325 mg by mouth every other day      losartan-hydroCHLOROthiazide (HYZAAR) 50-12.5 MG per tablet TAKE 1 TABLET BY MOUTH ONCE DAILY      metoprolol succinate (TOPROL XL) 25 MG extended release tablet       potassium chloride (KLOR-CON) 10 MEQ extended release tablet       Selenium Sulfide 2.25 % SHAM APPLY TO WET SCALP ONCE WEEKLY, WORK INTO A FULL LATHER, LEAVE ON SCALP FOR 2-3 MINUTES, RINSE THOUROUGHLY, AND THEN PAT DRY      simvastatin (ZOCOR) 5 MG tablet       PROLIA 60 MG/ML SOSY SC injection       CALQUENCE 100 MG CAPS Take 100 mg by mouth in the morning and at bedtime 60 capsule 5     No current facility-administered medications for this visit. Allergies: Allergies   Allergen Reactions    Nsaids      Other reaction(s): Other (see comments)  Has stage III kidney disease.     Adhesive Tape Rash         Subjective   REVIEW OF SYSTEMS:   CONSTITUTIONAL: no fever, no night sweats, fatigue;  HEENT: blurring of vision, no double vision, no hearing difficulty, no tinnitus, no ulceration, no dysplasia, no epistaxis;  LUNGS: no cough, no hemoptysis, no wheeze, exertional shortness of breath;  CARDIOVASCULAR: no palpitation, no chest pain,exertional shortness of breath;  GI:abdominal pain,heart burn, no nausea, no vomiting,diarrhea, constipation;  LUDMILA: no dysuria, no hematuria, no frequency or urgency, no nephrolithiasis;  MUSCULOSKELETAL:back pain, joint pain, RAOUL ankle swelling, bone pain,muscle pain,no stiffness;  ENDOCRINE: no polyuria, no polydipsia, no cold intolerance, heat intolerance;  HEMATOLOGY: easy bruising ,no bleeding, no history of clotting disorder;  DERMATOLOGY: no skin rash, no eczema, no pruritus;  PSYCHIATRY: depression, no anxiety, no panic attacks, no suicidal ideation, no homicidal ideation;  NEUROLOGY:hiccups, no syncope, no seizures, no numbness or tingling of hands, no numbness or tingling of feet, no paresis;    Objective   /72   Pulse 58   Ht 5' 2\" (1.575 m)   Wt 167 lb 8 oz (76 kg)   SpO2 97%   BMI 30.64 kg/m²     PHYSICAL EXAM:  CONSTITUTIONAL: Alert, appropriate, no acute distress  EYES: Non icteric, EOM intact, pupils equal round   ENT: Mucus membranes moist, no oral pharyngeal lesions, external inspection of ears and nose are normal  NECK: Supple, no masses. No palpable thyroid mass  CHEST/LUNGS: CTA bilaterally, normal respiratory effort   CARDIOVASCULAR: RRR, no murmurs. No lower extremity edema  ABDOMEN: soft non-tender, active bowel sounds, no HSM. No palpable masses  EXTREMITIES: warm, full ROM in all 4 extremities, no focal weakness. SKIN: warm, dry with no rashes or lesions  LYMPH: No cervical, clavicular, axillary, or inguinal lymphadenopathy  NEUROLOGIC: follows commands, non focal   PSYCH: mood and affect appropriate.   Alert and oriented to time, place, person      LABORATORY RESULTS REVIEWED/ANALYZED BY ME:  Lab Results   Component Value Date    WBC 6.56 02/22/2023    HGB 12.3 02/22/2023    HCT 40.4 02/22/2023    MCV 91.8 02/22/2023     (L) 02/22/2023     Lab Results   Component Value Date    NEUTROABS 4.46 02/22/2023       RADIOLOGY STUDIES REVIEWED BY ME:  As above      ASSESSMENT:    Orders Placed This Encounter   Procedures    CT CHEST WO CONTRAST     Standing Status:   Future     Standing Expiration Date:   8/22/2024     Scheduling Instructions:      SCHED MID MAY AT [facility]     Order Specific Question:   Reason for exam:     Answer:   COMPARE TO PREVIOUS SCANS FOR SURVEILLANCE    CT ABDOMEN PELVIS WO CONTRAST Additional Contrast? Oral     Standing Status:   Future     Standing Expiration Date:   8/22/2024     Scheduling Instructions:      SCHED MID MAY AT [facility]     Order Specific Question:   Additional Contrast?     Answer:   Oral     Order Specific Question:   Reason for exam:     Answer:   COMPARE TO PREVIOUS SCANS FOR SURVEILLANCE    CBC with Auto Differential     Standing Status:   Standing     Number of Occurrences:   12     Standing Expiration Date:   2/22/2024    Comprehensive Metabolic Panel     Standing Status:   Standing     Number of Occurrences:   12     Standing Expiration Date:   2/22/2024        Vasquez Desir was seen today for new patient. Diagnoses and all orders for this visit:    Marginal zone lymphoma ([address]  -     CT CHEST WO CONTRAST; Future  -     CT ABDOMEN PELVIS WO CONTRAST Additional Contrast? 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 She is in a CR    Osteoporosis  -Patient currently receiving Prolia with PCP    History of A-fib status post ibrutinib  -Status post Watchman procedure July 2022  -Currently on aspirin and metoprolol    Chronic kidney disease-creatinine 1.4        PLAN:  RTC with MD 3 months  CBC, CMP every 6 weeks at [address] [place] [address]/Acalabrutinib 100mg twice a day-script sent  Repeat CT chest, abdomen, pelvis in May 2023  Obtain 11/11/22 [place] CT disc      I, Marilin Ayala am pre-charting as a registered nurse for Scooby Arenas MD. Electronically signed by Marilin Ayala RN on 2/22/2023 at 3:27 PM CST. Ben Bravo am scribing for Scooby Arenas MD. Electronically signed by Marilin Ayala RN on 2/22/2023 at 1:45 PM CST. I, Dr Francisco Georges, personally performed the services described in this documentation as scribed by Marilin Ayala RN in my presence and is both accurate and complete. I have seen, examined and reviewed this patient medication list, appropriate labs and imaging studies. I reviewed relevant medical records and others physicians notes. I discussed the plans of care with the patient. I answered all the questions to the patients satisfaction. I have also reviewed the chief complaint (CC) and part of the history (History of Present Illness (HPI), Past Family Social History [facility]), or Review of Systems (ROS) and made changes when appropriated. (Please note that portions of this note were completed with a voice recognition program. Efforts were made to edit the dictations but occasionally words are mis-transcribed. )Electronically signed by Scooby Arenas MD on 2/22/2023 at 1:55 PM      The total time, 48 min I spent to see the patient today includes at least one or more of the following: preparing to see the patient by reviewing prior tests, prior notes or other relevant information, performing appropriate independent examination and evaluation, counseling, ordering of medications, tests or procedures, referrals, , documenting clinic information in the electronic medical record or other health records, independently interpreting results of tests, managing test results and communicating the results to the patient/family or caregiver.

## 2023-02-22 ENCOUNTER — OFFICE VISIT (OUTPATIENT)
Dept: HEMATOLOGY | Age: 73
End: 2023-02-22
Payer: MEDICARE

## 2023-02-22 ENCOUNTER — HOSPITAL ENCOUNTER (OUTPATIENT)
Dept: INFUSION THERAPY | Age: 73
Discharge: HOME | End: 2023-02-22
Payer: MEDICARE

## 2023-02-22 VITALS
DIASTOLIC BLOOD PRESSURE: 72 MMHG | BODY MASS INDEX: 30.82 KG/M2 | WEIGHT: 167.5 LBS | HEART RATE: 58 BPM | HEIGHT: 62 IN | SYSTOLIC BLOOD PRESSURE: 120 MMHG | OXYGEN SATURATION: 97 %

## 2023-02-22 DIAGNOSIS — C88.4 EXTRANODAL MARGINAL ZONE B-CELL LYMPHOMA (HCC): ICD-10-CM

## 2023-02-22 DIAGNOSIS — Z71.89 CARE PLAN DISCUSSED WITH PATIENT: ICD-10-CM

## 2023-02-22 DIAGNOSIS — C85.80 MARGINAL ZONE LYMPHOMA (HCC): Primary | ICD-10-CM

## 2023-02-22 LAB
BASOPHILS ABSOLUTE: 0.03 K/UL (ref 0.01–0.08)
BASOPHILS RELATIVE PERCENT: 0.5 % (ref 0.1–1.2)
EOSINOPHILS ABSOLUTE: 0.02 K/UL (ref 0.04–0.54)
EOSINOPHILS RELATIVE PERCENT: 0.3 % (ref 0.7–7)
HCT VFR BLD CALC: 40.4 % (ref 34.1–44.9)
HEMOGLOBIN: 12.3 G/DL (ref 11.2–15.7)
LYMPHOCYTES ABSOLUTE: 1.44 K/UL (ref 1.18–3.74)
LYMPHOCYTES RELATIVE PERCENT: 22 % (ref 19.3–53.1)
MCH RBC QN AUTO: 28 PG (ref 25.6–32.2)
MCHC RBC AUTO-ENTMCNC: 30.4 G/DL (ref 32.3–35.5)
MCV RBC AUTO: 91.8 FL (ref 79.4–94.8)
MONOCYTES ABSOLUTE: 0.6 K/UL (ref 0.24–0.82)
MONOCYTES RELATIVE PERCENT: 9.1 % (ref 4.7–12.5)
NEUTROPHILS ABSOLUTE: 4.46 K/UL (ref 1.56–6.13)
NEUTROPHILS RELATIVE PERCENT: 67.9 % (ref 34–71.1)
PDW BLD-RTO: 15.2 % (ref 11.7–14.4)
PLATELET # BLD: 158 K/UL (ref 182–369)
PMV BLD AUTO: 10.2 FL (ref 7.4–10.4)
RBC # BLD: 4.4 M/UL (ref 3.93–5.22)
WBC # BLD: 6.56 K/UL (ref 3.98–10.04)

## 2023-02-22 PROCEDURE — 99212 OFFICE O/P EST SF 10 MIN: CPT

## 2023-02-22 PROCEDURE — 1090F PRES/ABSN URINE INCON ASSESS: CPT | Performed by: INTERNAL MEDICINE

## 2023-02-22 PROCEDURE — 1036F TOBACCO NON-USER: CPT | Performed by: INTERNAL MEDICINE

## 2023-02-22 PROCEDURE — G8417 CALC BMI ABV UP PARAM F/U: HCPCS | Performed by: INTERNAL MEDICINE

## 2023-02-22 PROCEDURE — 36415 COLL VENOUS BLD VENIPUNCTURE: CPT

## 2023-02-22 PROCEDURE — 85025 COMPLETE CBC W/AUTO DIFF WBC: CPT

## 2023-02-22 PROCEDURE — G8400 PT W/DXA NO RESULTS DOC: HCPCS | Performed by: INTERNAL MEDICINE

## 2023-02-22 PROCEDURE — 99204 OFFICE O/P NEW MOD 45 MIN: CPT | Performed by: INTERNAL MEDICINE

## 2023-02-22 PROCEDURE — G8484 FLU IMMUNIZE NO ADMIN: HCPCS | Performed by: INTERNAL MEDICINE

## 2023-02-22 PROCEDURE — 3017F COLORECTAL CA SCREEN DOC REV: CPT | Performed by: INTERNAL MEDICINE

## 2023-02-22 PROCEDURE — G8428 CUR MEDS NOT DOCUMENT: HCPCS | Performed by: INTERNAL MEDICINE

## 2023-02-22 PROCEDURE — 1123F ACP DISCUSS/DSCN MKR DOCD: CPT | Performed by: INTERNAL MEDICINE

## 2023-02-22 RX ORDER — UBIDECARENONE 50 MG
200 CAPSULE ORAL DAILY
COMMUNITY

## 2023-02-22 RX ORDER — METOPROLOL SUCCINATE 25 MG/1
TABLET, EXTENDED RELEASE ORAL
COMMUNITY
Start: 2023-01-18

## 2023-02-22 RX ORDER — ACALABRUTINIB 100 MG/1
CAPSULE, GELATIN COATED ORAL
COMMUNITY
Start: 2023-01-25 | End: 2023-02-22 | Stop reason: SDUPTHER

## 2023-02-22 RX ORDER — ACALABRUTINIB 100 MG/1
100 CAPSULE, GELATIN COATED ORAL 2 TIMES DAILY
Qty: 60 CAPSULE | Refills: 5 | Status: ACTIVE | OUTPATIENT
Start: 2023-02-22

## 2023-02-22 RX ORDER — POTASSIUM CHLORIDE 750 MG/1
TABLET, FILM COATED, EXTENDED RELEASE ORAL
COMMUNITY
Start: 2023-01-15

## 2023-02-22 RX ORDER — SIMVASTATIN 5 MG
TABLET ORAL
COMMUNITY
Start: 2023-02-10

## 2023-02-22 RX ORDER — SELENIUM SULFIDE 22.5 MG/ML
SHAMPOO TOPICAL
COMMUNITY
Start: 2022-01-14

## 2023-02-22 RX ORDER — DENOSUMAB 60 MG/ML
INJECTION SUBCUTANEOUS
COMMUNITY
Start: 2023-01-16

## 2023-02-22 RX ORDER — LANOLIN ALCOHOL/MO/W.PET/CERES
325 CREAM (GRAM) TOPICAL EVERY OTHER DAY
COMMUNITY

## 2023-02-22 RX ORDER — ASPIRIN 81 MG/1
TABLET, CHEWABLE ORAL DAILY
COMMUNITY

## 2023-02-22 RX ORDER — LOSARTAN POTASSIUM AND HYDROCHLOROTHIAZIDE 12.5; 5 MG/1; MG/1
TABLET ORAL
COMMUNITY
Start: 2022-12-26

## 2023-02-22 ASSESSMENT — PROMIS GLOBAL HEALTH SCALE
IN THE PAST 7 DAYS, HOW WOULD YOU RATE YOUR FATIGUE ON AVERAGE [ON A SCALE FROM 1 (NONE) TO 5 (VERY SEVERE)]?: 4
IN GENERAL, WOULD YOU SAY YOUR HEALTH IS...[ON A SCALE OF 1 (POOR) TO 5 (EXCELLENT)]: 3
IN THE PAST 7 DAYS, HOW OFTEN HAVE YOU BEEN BOTHERED BY EMOTIONAL PROBLEMS, SUCH AS FEELING ANXIOUS, DEPRESSED, OR IRRITABLE [ON A SCALE FROM 1 (NEVER) TO 5 (ALWAYS)]?: 3
IN THE PAST 7 DAYS, HOW WOULD YOU RATE YOUR PAIN ON AVERAGE [ON A SCALE FROM 0 (NO PAIN) TO 10 (WORST IMAGINABLE PAIN)]?: 0
IN GENERAL, PLEASE RATE HOW WELL YOU CARRY OUT YOUR USUAL SOCIAL ACTIVITIES (INCLUDES ACTIVITIES AT HOME, AT WORK, AND IN YOUR COMMUNITY, AND RESPONSIBILITIES AS A PARENT, CHILD, SPOUSE, EMPLOYEE, FRIEND, ETC) [ON A SCALE OF 1 (POOR) TO 5 (EXCELLENT)]?: 4
SUM OF RESPONSES TO QUESTIONS 3, 6, 7, & 8: 12
SUM OF RESPONSES TO QUESTIONS 2, 4, 5, & 10: 12
IN GENERAL, HOW WOULD YOU RATE YOUR MENTAL HEALTH, INCLUDING YOUR MOOD AND YOUR ABILITY TO THINK [ON A SCALE OF 1 (POOR) TO 5 (EXCELLENT)]?: 2
IN GENERAL, WOULD YOU SAY YOUR QUALITY OF LIFE IS...[ON A SCALE OF 1 (POOR) TO 5 (EXCELLENT)]: 3
IN GENERAL, HOW WOULD YOU RATE YOUR PHYSICAL HEALTH [ON A SCALE OF 1 (POOR) TO 5 (EXCELLENT)]?: 3
TO WHAT EXTENT ARE YOU ABLE TO CARRY OUT YOUR EVERYDAY PHYSICAL ACTIVITIES SUCH AS WALKING, CLIMBING STAIRS, CARRYING GROCERIES, OR MOVING A CHAIR [ON A SCALE OF 1 (NOT AT ALL) TO 5 (COMPLETELY)]?: 5
IN GENERAL, HOW WOULD YOU RATE YOUR SATISFACTION WITH YOUR SOCIAL ACTIVITIES AND RELATIONSHIPS [ON A SCALE OF 1 (POOR) TO 5 (EXCELLENT)]?: 4

## 2023-02-24 ENCOUNTER — TELEPHONE (OUTPATIENT)
Dept: HEMATOLOGY | Age: 73
End: 2023-02-24

## 2023-02-24 ENCOUNTER — TELEPHONE (OUTPATIENT)
Dept: INFUSION THERAPY | Age: 73
End: 2023-02-24

## 2023-02-24 NOTE — TELEPHONE ENCOUNTER
Left a message for Josefa Darnell letting her know she was approved for free Calqu[facility] from the [facility]. She called back and I informed her of the approval and she will call me on Shelley Gannon if she doesn't hear from [facility].

## 2023-02-24 NOTE — TELEPHONE ENCOUNTER
MESSI Harkins completed Follow up phone visit with Delon Hernandez, following [facility] distress screening to assess patient needs. SW introduced self and explained role and source of support. Patient denies needs or concerns at this time. SW encouraged the patient to call if assistance is needed in the future.

## 2023-05-15 ENCOUNTER — HOSPITAL ENCOUNTER (OUTPATIENT)
Dept: GENERAL RADIOLOGY | Age: 73
Discharge: HOME | End: 2023-05-15
Payer: MEDICARE

## 2023-05-15 DIAGNOSIS — C85.80 MARGINAL ZONE LYMPHOMA (HCC): ICD-10-CM

## 2023-05-15 LAB
ALBUMIN SERPL-MCNC: 4.2 G/DL (ref 3.5–5.2)
ALP SERPL-CCNC: 73 U/L (ref 35–104)
ALT SERPL-CCNC: 21 U/L (ref 5–33)
ANION GAP SERPL CALCULATED.3IONS-SCNC: 12 MMOL/L (ref 7–19)
AST SERPL-CCNC: 24 U/L (ref 5–32)
BASOPHILS # BLD: 0 K/UL (ref 0–0.2)
BASOPHILS NFR BLD: 0.3 % (ref 0–1)
BILIRUB SERPL-MCNC: 0.5 MG/DL (ref 0.2–1.2)
BUN SERPL-MCNC: 25 MG/DL (ref 8–23)
CALCIUM SERPL-MCNC: 9.7 MG/DL (ref 8.8–10.2)
CHLORIDE SERPL-SCNC: 103 MMOL/L (ref 98–111)
CO2 SERPL-SCNC: 26 MMOL/L (ref 22–29)
CREAT SERPL-MCNC: 1.5 MG/DL (ref 0.5–0.9)
EOSINOPHIL # BLD: 0 K/UL (ref 0–0.6)
EOSINOPHIL NFR BLD: 0.7 % (ref 0–5)
ERYTHROCYTE [DISTWIDTH] IN BLOOD BY AUTOMATED COUNT: 15.2 % (ref 11.5–14.5)
GLUCOSE SERPL-MCNC: 111 MG/DL (ref 74–109)
HCT VFR BLD AUTO: 42.7 % (ref 37–47)
HGB BLD-MCNC: 13 G/DL (ref 12–16)
IMM GRANULOCYTES # BLD: 0 K/UL
LYMPHOCYTES # BLD: 1 K/UL (ref 1.1–4.5)
LYMPHOCYTES NFR BLD: 17 % (ref 20–40)
MCH RBC QN AUTO: 28.1 PG (ref 27–31)
MCHC RBC AUTO-ENTMCNC: 30.4 G/DL (ref 33–37)
MCV RBC AUTO: 92.2 FL (ref 81–99)
MONOCYTES # BLD: 0.5 K/UL (ref 0–0.9)
MONOCYTES NFR BLD: 8.8 % (ref 0–10)
NEUTROPHILS # BLD: 4.5 K/UL (ref 1.5–7.5)
NEUTS SEG NFR BLD: 72.9 % (ref 50–65)
PLATELET # BLD AUTO: 169 K/UL (ref 130–400)
PMV BLD AUTO: 10.6 FL (ref 9.4–12.3)
POTASSIUM SERPL-SCNC: 4.5 MMOL/L (ref 3.5–5)
PROT SERPL-MCNC: 6.6 G/DL (ref 6.6–8.7)
RBC # BLD AUTO: 4.63 M/UL (ref 4.2–5.4)
SODIUM SERPL-SCNC: 141 MMOL/L (ref 136–145)
WBC # BLD AUTO: 6.1 K/UL (ref 4.8–10.8)

## 2023-05-15 PROCEDURE — 2500000003 HC RX 250 WO HCPCS: Performed by: INTERNAL MEDICINE

## 2023-05-15 PROCEDURE — 74176 CT ABD & PELVIS W/O CONTRAST: CPT

## 2023-05-15 PROCEDURE — 74176 CT ABD & PELVIS W/O CONTRAST: CPT | Performed by: RADIOLOGY

## 2023-05-15 PROCEDURE — 71250 CT THORAX DX C-: CPT | Performed by: RADIOLOGY

## 2023-05-15 PROCEDURE — 71250 CT THORAX DX C-: CPT

## 2023-05-15 RX ADMIN — BARIUM SULFATE 450 ML: 21 SUSPENSION ORAL at 10:44

## 2023-05-22 ENCOUNTER — OFFICE VISIT (OUTPATIENT)
Dept: HEMATOLOGY | Age: 73
End: 2023-05-22
Payer: MEDICARE

## 2023-05-22 ENCOUNTER — HOSPITAL ENCOUNTER (OUTPATIENT)
Dept: INFUSION THERAPY | Age: 73
Discharge: HOME | End: 2023-05-22
Payer: MEDICARE

## 2023-05-22 VITALS
DIASTOLIC BLOOD PRESSURE: 78 MMHG | TEMPERATURE: 98 F | WEIGHT: 165.5 LBS | SYSTOLIC BLOOD PRESSURE: 120 MMHG | OXYGEN SATURATION: 96 % | HEART RATE: 74 BPM | BODY MASS INDEX: 30.27 KG/M2

## 2023-05-22 DIAGNOSIS — C88.4 EXTRANODAL MARGINAL ZONE B-CELL LYMPHOMA (HCC): ICD-10-CM

## 2023-05-22 DIAGNOSIS — T45.1X5A CHEMOTHERAPY INDUCED DIARRHEA: ICD-10-CM

## 2023-05-22 DIAGNOSIS — Z71.89 CARE PLAN DISCUSSED WITH PATIENT: ICD-10-CM

## 2023-05-22 DIAGNOSIS — R53.0 NEOPLASTIC MALIGNANT RELATED FATIGUE: ICD-10-CM

## 2023-05-22 DIAGNOSIS — K52.1 CHEMOTHERAPY INDUCED DIARRHEA: ICD-10-CM

## 2023-05-22 DIAGNOSIS — C85.80 MARGINAL ZONE LYMPHOMA (HCC): Primary | ICD-10-CM

## 2023-05-22 LAB
BASOPHILS # BLD: 0.03 K/UL (ref 0.01–0.08)
BASOPHILS NFR BLD: 0.4 % (ref 0.1–1.2)
EOSINOPHIL # BLD: 0.02 K/UL (ref 0.04–0.54)
EOSINOPHIL NFR BLD: 0.3 % (ref 0.7–7)
ERYTHROCYTE [DISTWIDTH] IN BLOOD BY AUTOMATED COUNT: 15.2 % (ref 11.7–14.4)
HCT VFR BLD AUTO: 44 % (ref 34.1–44.9)
HGB BLD-MCNC: 13 G/DL (ref 11.2–15.7)
LYMPHOCYTES # BLD: 1.45 K/UL (ref 1.18–3.74)
LYMPHOCYTES NFR BLD: 18.2 % (ref 19.3–53.1)
MCH RBC QN AUTO: 27.9 PG (ref 25.6–32.2)
MCHC RBC AUTO-ENTMCNC: 29.5 G/DL (ref 32.3–35.5)
MCV RBC AUTO: 94.4 FL (ref 79.4–94.8)
MONOCYTES # BLD: 0.62 K/UL (ref 0.24–0.82)
MONOCYTES NFR BLD: 7.8 % (ref 4.7–12.5)
NEUTROPHILS # BLD: 5.82 K/UL (ref 1.56–6.13)
NEUTS SEG NFR BLD: 73 % (ref 34–71.1)
PLATELET # BLD AUTO: 166 K/UL (ref 182–369)
PMV BLD AUTO: 10.3 FL (ref 7.4–10.4)
RBC # BLD AUTO: 4.66 M/UL (ref 3.93–5.22)
WBC # BLD AUTO: 7.96 K/UL (ref 3.98–10.04)

## 2023-05-22 PROCEDURE — 1123F ACP DISCUSS/DSCN MKR DOCD: CPT | Performed by: INTERNAL MEDICINE

## 2023-05-22 PROCEDURE — 1090F PRES/ABSN URINE INCON ASSESS: CPT | Performed by: INTERNAL MEDICINE

## 2023-05-22 PROCEDURE — G8417 CALC BMI ABV UP PARAM F/U: HCPCS | Performed by: INTERNAL MEDICINE

## 2023-05-22 PROCEDURE — 36415 COLL VENOUS BLD VENIPUNCTURE: CPT

## 2023-05-22 PROCEDURE — 3017F COLORECTAL CA SCREEN DOC REV: CPT | Performed by: INTERNAL MEDICINE

## 2023-05-22 PROCEDURE — G8400 PT W/DXA NO RESULTS DOC: HCPCS | Performed by: INTERNAL MEDICINE

## 2023-05-22 PROCEDURE — 1036F TOBACCO NON-USER: CPT | Performed by: INTERNAL MEDICINE

## 2023-05-22 PROCEDURE — 99214 OFFICE O/P EST MOD 30 MIN: CPT | Performed by: INTERNAL MEDICINE

## 2023-05-22 PROCEDURE — G8427 DOCREV CUR MEDS BY ELIG CLIN: HCPCS | Performed by: INTERNAL MEDICINE

## 2023-05-22 PROCEDURE — 85025 COMPLETE CBC W/AUTO DIFF WBC: CPT

## 2023-05-22 PROCEDURE — 99211 OFF/OP EST MAY X REQ PHY/QHP: CPT

## 2023-06-28 DIAGNOSIS — C88.4 EXTRANODAL MARGINAL ZONE B-CELL LYMPHOMA (HCC): ICD-10-CM

## 2023-06-28 LAB
BASOPHILS # BLD: 0 K/UL (ref 0–0.2)
BASOPHILS NFR BLD: 0.3 % (ref 0–1)
EOSINOPHIL # BLD: 0.1 K/UL (ref 0–0.6)
EOSINOPHIL NFR BLD: 0.7 % (ref 0–5)
ERYTHROCYTE [DISTWIDTH] IN BLOOD BY AUTOMATED COUNT: 15.2 % (ref 11.5–14.5)
HCT VFR BLD AUTO: 38.4 % (ref 37–47)
HGB BLD-MCNC: 12.1 G/DL (ref 12–16)
IMM GRANULOCYTES # BLD: 0 K/UL
LYMPHOCYTES # BLD: 1.3 K/UL (ref 1.1–4.5)
LYMPHOCYTES NFR BLD: 18.6 % (ref 20–40)
MCH RBC QN AUTO: 28.4 PG (ref 27–31)
MCHC RBC AUTO-ENTMCNC: 31.5 G/DL (ref 33–37)
MCV RBC AUTO: 90.1 FL (ref 81–99)
MONOCYTES # BLD: 0.6 K/UL (ref 0–0.9)
MONOCYTES NFR BLD: 9.2 % (ref 0–10)
NEUTROPHILS # BLD: 4.9 K/UL (ref 1.5–7.5)
NEUTS SEG NFR BLD: 70.8 % (ref 50–65)
PLATELET # BLD AUTO: 169 K/UL (ref 130–400)
PMV BLD AUTO: 10.9 FL (ref 9.4–12.3)
RBC # BLD AUTO: 4.26 M/UL (ref 4.2–5.4)
WBC # BLD AUTO: 6.9 K/UL (ref 4.8–10.8)

## 2023-08-09 DIAGNOSIS — C88.4 EXTRANODAL MARGINAL ZONE B-CELL LYMPHOMA (HCC): ICD-10-CM

## 2023-08-09 LAB
BASOPHILS # BLD: 0 K/UL (ref 0–0.2)
BASOPHILS NFR BLD: 0.4 % (ref 0–1)
EOSINOPHIL # BLD: 0 K/UL (ref 0–0.6)
EOSINOPHIL NFR BLD: 0.4 % (ref 0–5)
ERYTHROCYTE [DISTWIDTH] IN BLOOD BY AUTOMATED COUNT: 15.1 % (ref 11.5–14.5)
HCT VFR BLD AUTO: 39.3 % (ref 37–47)
HGB BLD-MCNC: 12.3 G/DL (ref 12–16)
IMM GRANULOCYTES # BLD: 0 K/UL
LYMPHOCYTES # BLD: 1.7 K/UL (ref 1.1–4.5)
LYMPHOCYTES NFR BLD: 21.2 % (ref 20–40)
MCH RBC QN AUTO: 28.3 PG (ref 27–31)
MCHC RBC AUTO-ENTMCNC: 31.3 G/DL (ref 33–37)
MCV RBC AUTO: 90.3 FL (ref 81–99)
MONOCYTES # BLD: 0.7 K/UL (ref 0–0.9)
MONOCYTES NFR BLD: 8.3 % (ref 0–10)
NEUTROPHILS # BLD: 5.6 K/UL (ref 1.5–7.5)
NEUTS SEG NFR BLD: 69.3 % (ref 50–65)
PLATELET # BLD AUTO: 180 K/UL (ref 130–400)
PMV BLD AUTO: 10.4 FL (ref 9.4–12.3)
RBC # BLD AUTO: 4.35 M/UL (ref 4.2–5.4)
WBC # BLD AUTO: 8.1 K/UL (ref 4.8–10.8)

## 2023-08-18 NOTE — PROGRESS NOTES
MEDICAL ONCOLOGY PROGRESS NOTE    Pt Name: Salazar Riley  MRN: [id number]  YOB: 1950  Date of evaluation: 8/22/2023    HISTORY OF PRESENT ILLNESS:    Diagnosis  Marginal zone lymphoma, extranodal, right lower lung, 2006  Consistent with small B-cell lymphoma  Recurrence paraspinal mass close to her right kidney, 2010  Recurrence with a mediastinal mass, 2013  Recurrence was consistent with marginal zone lymphoma, Oct 2018    Treatment Summary  2006 Resection of the RLL and RML  2006 Radiation 3000 cGy  2013 Rituxan and bendamustine x 2 cycles and then switched to R-CVP x 4 cycles due to cytopenias followed by Rituxan maintenance for one year  2018 -04/2019 Obinutuzumab  6/26/19-2/24/21 ME-401+ rituximab (CR, stopped due to diarrhea/colitis)  11/2021-4/25/22 Ibrutinib (discontinued due to recurrent hematuria, hypokalemia and AFRVR)  07/2022 Initiated Acalabrutinib 100 mg BID  5/22/2023-Acalabrutinib 100 mg p.o. twice daily Monday through Wednesday, 100 mg p.o. daily T-S    Cancer History  Mrs. Jacinto Valdivia is a very pleasant 74 y.o. patient who reports to clinic for a second opinon regarding her diagnosis of lymphoma. She was initially diagnosed with with extranodal marginal zone lymphoma of mucosa associalted lymphoid tissue (MALT) of the right lower lung in 2006. Her pathology was reviewed at [facility] and was consistent with small B-cell lymphoma. She had resection of the RLL and RML followed by radiation 3000 cGy. She had a recurrence in 2010 with a paraspinal mass that was close to her right kidney. She received radiation to that area by Dr. Marjorie Guallpa. She had recurrence in 2013 with a mediastinal mass. This mass was not biopsied at the time. Mirian Olguin She was treated with Rituxan and bendamustine x 2 cycles and then switched to R-CVP x 4 cycles due to cytopenias followed by Rituxan maintenance for one year. She transfered care from Dr. Edyta Mcginnis to Dr. Michael Thakur .  CT C/A/P 6/2018 showed

## 2023-08-22 ENCOUNTER — HOSPITAL ENCOUNTER (OUTPATIENT)
Dept: INFUSION THERAPY | Age: 73
Discharge: HOME | End: 2023-08-22
Payer: MEDICARE

## 2023-08-22 ENCOUNTER — TELEPHONE (OUTPATIENT)
Dept: HEMATOLOGY | Age: 73
End: 2023-08-22

## 2023-08-22 ENCOUNTER — OFFICE VISIT (OUTPATIENT)
Dept: HEMATOLOGY | Age: 73
End: 2023-08-22
Payer: MEDICARE

## 2023-08-22 VITALS
WEIGHT: 166 LBS | SYSTOLIC BLOOD PRESSURE: 120 MMHG | DIASTOLIC BLOOD PRESSURE: 70 MMHG | HEART RATE: 85 BPM | TEMPERATURE: 97.6 F | OXYGEN SATURATION: 98 % | BODY MASS INDEX: 30.36 KG/M2

## 2023-08-22 DIAGNOSIS — T45.1X5S ANTINEOPLASTIC DRUGS CAUSING ADVERSE EFFECT, SEQUELA: ICD-10-CM

## 2023-08-22 DIAGNOSIS — L98.9 SKIN LESION OF CHEST WALL: ICD-10-CM

## 2023-08-22 DIAGNOSIS — C88.4 EXTRANODAL MARGINAL ZONE B-CELL LYMPHOMA (HCC): ICD-10-CM

## 2023-08-22 DIAGNOSIS — C85.80 MARGINAL ZONE LYMPHOMA (HCC): Primary | ICD-10-CM

## 2023-08-22 DIAGNOSIS — C85.80 MARGINAL ZONE LYMPHOMA (HCC): ICD-10-CM

## 2023-08-22 LAB
ALBUMIN SERPL-MCNC: 4.1 G/DL (ref 3.5–5.2)
ALP SERPL-CCNC: 83 U/L (ref 35–104)
ALT SERPL-CCNC: 29 U/L (ref 9–52)
ANION GAP SERPL CALCULATED.3IONS-SCNC: 13 MMOL/L (ref 7–19)
AST SERPL-CCNC: 31 U/L (ref 14–36)
BILIRUB SERPL-MCNC: 0.8 MG/DL (ref 0.2–1.3)
BUN SERPL-MCNC: 27 MG/DL (ref 7–17)
CALCIUM SERPL-MCNC: 9.3 MG/DL (ref 8.4–10.2)
CHLORIDE SERPL-SCNC: 104 MMOL/L (ref 98–111)
CO2 SERPL-SCNC: 27 MMOL/L (ref 22–29)
CREAT SERPL-MCNC: 1.6 MG/DL (ref 0.5–1)
ERYTHROCYTE [DISTWIDTH] IN BLOOD BY AUTOMATED COUNT: 15.1 % (ref 11.7–14.4)
GLOBULIN: 2.6 G/DL
GLUCOSE SERPL-MCNC: 103 MG/DL (ref 74–106)
HCT VFR BLD AUTO: 36.1 % (ref 34.1–44.9)
HGB BLD-MCNC: 11.7 G/DL (ref 11.2–15.7)
LYMPHOCYTES # BLD: 1.21 K/UL (ref 1.18–3.74)
LYMPHOCYTES NFR BLD: 15.7 % (ref 19.3–53.1)
MCH RBC QN AUTO: 27.9 PG (ref 25.6–32.2)
MCHC RBC AUTO-ENTMCNC: 32.4 G/DL (ref 32.3–35.5)
MCV RBC AUTO: 86 FL (ref 79.4–94.8)
MONOCYTES # BLD: 0.86 K/UL (ref 0.24–0.82)
MONOCYTES NFR BLD: 11.1 % (ref 4.7–12.5)
NEUTROPHILS # BLD: 5.59 K/UL (ref 1.56–6.13)
NEUTS SEG NFR BLD: 72.2 % (ref 34–71.1)
PLATELET # BLD AUTO: 158 K/UL (ref 182–369)
PMV BLD AUTO: 10.2 FL (ref 7.4–10.4)
POTASSIUM SERPL-SCNC: 4.4 MMOL/L (ref 3.5–5.1)
PROT SERPL-MCNC: 6.6 G/DL (ref 6.3–8.2)
RBC # BLD AUTO: 4.2 M/UL (ref 3.93–5.22)
SODIUM SERPL-SCNC: 144 MMOL/L (ref 137–145)
WBC # BLD AUTO: 7.73 K/UL (ref 3.98–10.04)

## 2023-08-22 PROCEDURE — 85025 COMPLETE CBC W/AUTO DIFF WBC: CPT

## 2023-08-22 PROCEDURE — 1090F PRES/ABSN URINE INCON ASSESS: CPT | Performed by: INTERNAL MEDICINE

## 2023-08-22 PROCEDURE — 99212 OFFICE O/P EST SF 10 MIN: CPT

## 2023-08-22 PROCEDURE — G8427 DOCREV CUR MEDS BY ELIG CLIN: HCPCS | Performed by: INTERNAL MEDICINE

## 2023-08-22 PROCEDURE — 1036F TOBACCO NON-USER: CPT | Performed by: INTERNAL MEDICINE

## 2023-08-22 PROCEDURE — 36415 COLL VENOUS BLD VENIPUNCTURE: CPT

## 2023-08-22 PROCEDURE — G8417 CALC BMI ABV UP PARAM F/U: HCPCS | Performed by: INTERNAL MEDICINE

## 2023-08-22 PROCEDURE — 1123F ACP DISCUSS/DSCN MKR DOCD: CPT | Performed by: INTERNAL MEDICINE

## 2023-08-22 PROCEDURE — 99213 OFFICE O/P EST LOW 20 MIN: CPT | Performed by: INTERNAL MEDICINE

## 2023-08-22 PROCEDURE — G8400 PT W/DXA NO RESULTS DOC: HCPCS | Performed by: INTERNAL MEDICINE

## 2023-08-22 PROCEDURE — 3017F COLORECTAL CA SCREEN DOC REV: CPT | Performed by: INTERNAL MEDICINE

## 2023-08-22 PROCEDURE — 80053 COMPREHEN METABOLIC PANEL: CPT

## 2023-08-22 NOTE — TELEPHONE ENCOUNTER
Attempted to contact pt in regards to appt with Dr. [facility] no answer vm box full. Nov. 15 @ 1:45pm.    Mailed letter to pt as well with appt info and their ph. number.

## 2023-10-04 DIAGNOSIS — C88.4 EXTRANODAL MARGINAL ZONE B-CELL LYMPHOMA (HCC): ICD-10-CM

## 2023-10-04 DIAGNOSIS — C85.80 MARGINAL ZONE LYMPHOMA (HCC): ICD-10-CM

## 2023-10-04 LAB
ALBUMIN SERPL-MCNC: 3.8 G/DL (ref 3.5–5.2)
ALP SERPL-CCNC: 82 U/L (ref 35–104)
ALT SERPL-CCNC: 21 U/L (ref 5–33)
ANION GAP SERPL CALCULATED.3IONS-SCNC: 13 MMOL/L (ref 7–19)
AST SERPL-CCNC: 24 U/L (ref 5–32)
BASOPHILS # BLD: 0 K/UL (ref 0–0.2)
BASOPHILS NFR BLD: 0.4 % (ref 0–1)
BILIRUB SERPL-MCNC: 0.7 MG/DL (ref 0.2–1.2)
BUN SERPL-MCNC: 22 MG/DL (ref 8–23)
CALCIUM SERPL-MCNC: 9.6 MG/DL (ref 8.8–10.2)
CHLORIDE SERPL-SCNC: 99 MMOL/L (ref 98–111)
CO2 SERPL-SCNC: 25 MMOL/L (ref 22–29)
CREAT SERPL-MCNC: 1.3 MG/DL (ref 0.5–0.9)
EOSINOPHIL # BLD: 0.1 K/UL (ref 0–0.6)
EOSINOPHIL NFR BLD: 1.3 % (ref 0–5)
ERYTHROCYTE [DISTWIDTH] IN BLOOD BY AUTOMATED COUNT: 16.4 % (ref 11.5–14.5)
GLUCOSE SERPL-MCNC: 166 MG/DL (ref 74–109)
HCT VFR BLD AUTO: 39.8 % (ref 37–47)
HGB BLD-MCNC: 12.1 G/DL (ref 12–16)
IMM GRANULOCYTES # BLD: 0 K/UL
LYMPHOCYTES # BLD: 1.7 K/UL (ref 1.1–4.5)
LYMPHOCYTES NFR BLD: 23.9 % (ref 20–40)
MCH RBC QN AUTO: 28 PG (ref 27–31)
MCHC RBC AUTO-ENTMCNC: 30.4 G/DL (ref 33–37)
MCV RBC AUTO: 92.1 FL (ref 81–99)
MONOCYTES # BLD: 0.6 K/UL (ref 0–0.9)
MONOCYTES NFR BLD: 9.1 % (ref 0–10)
NEUTROPHILS # BLD: 4.6 K/UL (ref 1.5–7.5)
NEUTS SEG NFR BLD: 65 % (ref 50–65)
PLATELET # BLD AUTO: 177 K/UL (ref 130–400)
PMV BLD AUTO: 10.6 FL (ref 9.4–12.3)
POTASSIUM SERPL-SCNC: 4.1 MMOL/L (ref 3.5–5)
PROT SERPL-MCNC: 6.5 G/DL (ref 6.6–8.7)
RBC # BLD AUTO: 4.32 M/UL (ref 4.2–5.4)
SODIUM SERPL-SCNC: 137 MMOL/L (ref 136–145)
WBC # BLD AUTO: 7 K/UL (ref 4.8–10.8)

## 2023-10-30 DIAGNOSIS — C85.80 MARGINAL ZONE LYMPHOMA (HCC): ICD-10-CM

## 2023-10-30 RX ORDER — ACALABRUTINIB 100 MG/1
100 CAPSULE, GELATIN COATED ORAL 2 TIMES DAILY
Qty: 60 CAPSULE | Refills: 5 | Status: ACTIVE | OUTPATIENT
Start: 2023-10-30

## 2023-11-14 ENCOUNTER — HOSPITAL ENCOUNTER (OUTPATIENT)
Dept: GENERAL RADIOLOGY | Age: 73
Discharge: HOME | End: 2023-11-14
Attending: INTERNAL MEDICINE
Payer: MEDICARE

## 2023-11-14 DIAGNOSIS — C85.80 MARGINAL ZONE LYMPHOMA (HCC): ICD-10-CM

## 2023-11-14 DIAGNOSIS — C88.4 EXTRANODAL MARGINAL ZONE B-CELL LYMPHOMA (HCC): ICD-10-CM

## 2023-11-14 LAB
ALBUMIN SERPL-MCNC: 4 G/DL (ref 3.5–5.2)
ALP SERPL-CCNC: 81 U/L (ref 35–104)
ALT SERPL-CCNC: 21 U/L (ref 5–33)
ANION GAP SERPL CALCULATED.3IONS-SCNC: 14 MMOL/L (ref 7–19)
AST SERPL-CCNC: 27 U/L (ref 5–32)
BASOPHILS # BLD: 0 K/UL (ref 0–0.2)
BASOPHILS NFR BLD: 0.3 % (ref 0–1)
BILIRUB SERPL-MCNC: 0.5 MG/DL (ref 0.2–1.2)
BUN SERPL-MCNC: 24 MG/DL (ref 8–23)
CALCIUM SERPL-MCNC: 10.1 MG/DL (ref 8.8–10.2)
CHLORIDE SERPL-SCNC: 100 MMOL/L (ref 98–111)
CO2 SERPL-SCNC: 24 MMOL/L (ref 22–29)
CREAT SERPL-MCNC: 1.4 MG/DL (ref 0.5–0.9)
EOSINOPHIL # BLD: 0.1 K/UL (ref 0–0.6)
EOSINOPHIL NFR BLD: 0.9 % (ref 0–5)
ERYTHROCYTE [DISTWIDTH] IN BLOOD BY AUTOMATED COUNT: 18.9 % (ref 11.5–14.5)
GLUCOSE SERPL-MCNC: 122 MG/DL (ref 74–109)
HCT VFR BLD AUTO: 42.6 % (ref 37–47)
HGB BLD-MCNC: 12.8 G/DL (ref 12–16)
IMM GRANULOCYTES # BLD: 0 K/UL
LYMPHOCYTES # BLD: 1.3 K/UL (ref 1.1–4.5)
LYMPHOCYTES NFR BLD: 20.2 % (ref 20–40)
MCH RBC QN AUTO: 29.5 PG (ref 27–31)
MCHC RBC AUTO-ENTMCNC: 30 G/DL (ref 33–37)
MCV RBC AUTO: 98.2 FL (ref 81–99)
MONOCYTES # BLD: 0.6 K/UL (ref 0–0.9)
MONOCYTES NFR BLD: 9.1 % (ref 0–10)
NEUTROPHILS # BLD: 4.6 K/UL (ref 1.5–7.5)
NEUTS SEG NFR BLD: 69.2 % (ref 50–65)
PLATELET # BLD AUTO: 187 K/UL (ref 130–400)
PMV BLD AUTO: 10.6 FL (ref 9.4–12.3)
POTASSIUM SERPL-SCNC: 4.2 MMOL/L (ref 3.5–5)
PROT SERPL-MCNC: 6.3 G/DL (ref 6.6–8.7)
RBC # BLD AUTO: 4.34 M/UL (ref 4.2–5.4)
SODIUM SERPL-SCNC: 138 MMOL/L (ref 136–145)
WBC # BLD AUTO: 6.6 K/UL (ref 4.8–10.8)

## 2023-11-14 PROCEDURE — 74176 CT ABD & PELVIS W/O CONTRAST: CPT

## 2023-11-14 PROCEDURE — 2500000003 HC RX 250 WO HCPCS: Performed by: INTERNAL MEDICINE

## 2023-11-14 PROCEDURE — 71250 CT THORAX DX C-: CPT

## 2023-11-14 RX ADMIN — BARIUM SULFATE 450 ML: 21 SUSPENSION ORAL at 11:31

## 2023-11-20 NOTE — PROGRESS NOTES
Saba Beltrán. She had recurrence in 2013 with a mediastinal mass. This mass was not biopsied at the time. Wes Brain She was treated with Rituxan and bendamustine x 2 cycles and then switched to R-CVP x 4 cycles due to cytopenias followed by Rituxan maintenance for one year. She transfered care from Dr. Karthikeyan Phillip to Dr. Steve Bazan . CT C/A/P 6/2018 showed a new ill defined infiltratin L renal lower pole mass that was not seen on her previous scan. Of note scans also show previous RML and RLL resection, and atrophic R kidney (presumed to be secondary to previous RT) and a stable 5cm left renal cyst. Repeat CT abdomen 8/23/2018 showed this infiltrative process in the left renal sinus fat that was felt to be most consistent with her history of lymphoma. Biopsy of this mass 9/14/2018 (FNA with core) showed atypical lymphoid infiltrate. Pt had a repeat renal biopsy in 10/2018 and this was consistent with marginal zone lymphoma. We recommended therapy with obinutuzumab. She tolerated this well and completed this treatment in 04/2019. Patient has no new side effects related to medication. Patient scans stable and discussed with patient. 2006 Resection of the RLL and RML  2006 Marginal zone lymphoma, extranodal, right lower lung, consistent with small B-cell lymphoma. 2006 Radiation 3000 cGy  2010 Recurrence paraspinal mass close to her right kidney  2013 Rituxan and bendamustine x 2 cycles and then switched to R-CVP x 4 cycles due to cytopenias followed by Rituxan maintenance for one year  8/23/18 CT chest abdomen pelvis w/o [facility]): There is an enhancing infiltrative process involving the left renal sinus fat and perirenal fat as described above. This is most consistent with lymphoma. Urothelial carcinoma is much less likely possibility. There is a large cyst in the mid to upper left kidney. The right kidney is scarred and atrophic. Coronary artery disease and aortoiliac atherosclerosis.  Status post prior right middle and

## 2023-11-21 ENCOUNTER — OFFICE VISIT (OUTPATIENT)
Dept: HEMATOLOGY | Age: 73
End: 2023-11-21
Payer: MEDICARE

## 2023-11-21 ENCOUNTER — HOSPITAL ENCOUNTER (OUTPATIENT)
Dept: INFUSION THERAPY | Age: 73
Discharge: HOME | End: 2023-11-21
Payer: MEDICARE

## 2023-11-21 VITALS
SYSTOLIC BLOOD PRESSURE: 120 MMHG | OXYGEN SATURATION: 97 % | HEIGHT: 62 IN | TEMPERATURE: 98.1 F | HEART RATE: 66 BPM | WEIGHT: 166 LBS | BODY MASS INDEX: 30.55 KG/M2 | DIASTOLIC BLOOD PRESSURE: 80 MMHG

## 2023-11-21 DIAGNOSIS — N18.4 STAGE 4 CHRONIC KIDNEY DISEASE (HCC): ICD-10-CM

## 2023-11-21 DIAGNOSIS — M81.0 AGE-RELATED OSTEOPOROSIS WITHOUT CURRENT PATHOLOGICAL FRACTURE: ICD-10-CM

## 2023-11-21 DIAGNOSIS — Z71.89 CARE PLAN DISCUSSED WITH PATIENT: ICD-10-CM

## 2023-11-21 DIAGNOSIS — C88.4 EXTRANODAL MARGINAL ZONE B-CELL LYMPHOMA (HCC): ICD-10-CM

## 2023-11-21 DIAGNOSIS — T45.1X5S ANTINEOPLASTIC DRUGS CAUSING ADVERSE EFFECT, SEQUELA: ICD-10-CM

## 2023-11-21 DIAGNOSIS — C85.80 MARGINAL ZONE LYMPHOMA (HCC): Primary | ICD-10-CM

## 2023-11-21 PROCEDURE — G8484 FLU IMMUNIZE NO ADMIN: HCPCS | Performed by: INTERNAL MEDICINE

## 2023-11-21 PROCEDURE — 1123F ACP DISCUSS/DSCN MKR DOCD: CPT | Performed by: INTERNAL MEDICINE

## 2023-11-21 PROCEDURE — 99212 OFFICE O/P EST SF 10 MIN: CPT

## 2023-11-21 PROCEDURE — G8427 DOCREV CUR MEDS BY ELIG CLIN: HCPCS | Performed by: INTERNAL MEDICINE

## 2023-11-21 PROCEDURE — G8400 PT W/DXA NO RESULTS DOC: HCPCS | Performed by: INTERNAL MEDICINE

## 2023-11-21 PROCEDURE — G8417 CALC BMI ABV UP PARAM F/U: HCPCS | Performed by: INTERNAL MEDICINE

## 2023-11-21 PROCEDURE — 1036F TOBACCO NON-USER: CPT | Performed by: INTERNAL MEDICINE

## 2023-11-21 PROCEDURE — 99213 OFFICE O/P EST LOW 20 MIN: CPT | Performed by: INTERNAL MEDICINE

## 2023-11-21 PROCEDURE — 1090F PRES/ABSN URINE INCON ASSESS: CPT | Performed by: INTERNAL MEDICINE

## 2023-11-21 PROCEDURE — 3017F COLORECTAL CA SCREEN DOC REV: CPT | Performed by: INTERNAL MEDICINE

## 2023-11-21 RX ORDER — ALLOPURINOL 100 MG/1
100 TABLET ORAL DAILY
COMMUNITY
Start: 2023-09-04

## 2023-12-20 DIAGNOSIS — C85.80 MARGINAL ZONE LYMPHOMA (HCC): ICD-10-CM

## 2023-12-20 LAB
ALBUMIN SERPL-MCNC: 3.9 G/DL (ref 3.5–5.2)
ALP SERPL-CCNC: 74 U/L (ref 35–104)
ALT SERPL-CCNC: 20 U/L (ref 5–33)
ANION GAP SERPL CALCULATED.3IONS-SCNC: 14 MMOL/L (ref 7–19)
AST SERPL-CCNC: 24 U/L (ref 5–32)
BASOPHILS # BLD: 0 K/UL (ref 0–0.2)
BASOPHILS NFR BLD: 0.5 % (ref 0–1)
BILIRUB SERPL-MCNC: 0.7 MG/DL (ref 0.2–1.2)
BUN SERPL-MCNC: 24 MG/DL (ref 8–23)
CALCIUM SERPL-MCNC: 10 MG/DL (ref 8.8–10.2)
CHLORIDE SERPL-SCNC: 103 MMOL/L (ref 98–111)
CO2 SERPL-SCNC: 24 MMOL/L (ref 22–29)
CREAT SERPL-MCNC: 1.3 MG/DL (ref 0.5–0.9)
EOSINOPHIL # BLD: 0.1 K/UL (ref 0–0.6)
EOSINOPHIL NFR BLD: 0.8 % (ref 0–5)
ERYTHROCYTE [DISTWIDTH] IN BLOOD BY AUTOMATED COUNT: 18.6 % (ref 11.5–14.5)
GLUCOSE SERPL-MCNC: 111 MG/DL (ref 74–109)
HCT VFR BLD AUTO: 37.8 % (ref 37–47)
HGB BLD-MCNC: 11.6 G/DL (ref 12–16)
IMM GRANULOCYTES # BLD: 0 K/UL
LYMPHOCYTES # BLD: 1.3 K/UL (ref 1.1–4.5)
LYMPHOCYTES NFR BLD: 21.9 % (ref 20–40)
MCH RBC QN AUTO: 29.9 PG (ref 27–31)
MCHC RBC AUTO-ENTMCNC: 30.7 G/DL (ref 33–37)
MCV RBC AUTO: 97.4 FL (ref 81–99)
MONOCYTES # BLD: 0.6 K/UL (ref 0–0.9)
MONOCYTES NFR BLD: 9.3 % (ref 0–10)
NEUTROPHILS # BLD: 4.1 K/UL (ref 1.5–7.5)
NEUTS SEG NFR BLD: 67.2 % (ref 50–65)
PLATELET # BLD AUTO: 177 K/UL (ref 130–400)
PMV BLD AUTO: 11.2 FL (ref 9.4–12.3)
POTASSIUM SERPL-SCNC: 3.8 MMOL/L (ref 3.5–5)
PROT SERPL-MCNC: 6.2 G/DL (ref 6.6–8.7)
RBC # BLD AUTO: 3.88 M/UL (ref 4.2–5.4)
SODIUM SERPL-SCNC: 141 MMOL/L (ref 136–145)
WBC # BLD AUTO: 6.1 K/UL (ref 4.8–10.8)

## 2024-01-12 ENCOUNTER — CLINICAL DOCUMENTATION (OUTPATIENT)
Facility: HOSPITAL | Age: 74
End: 2024-01-12

## 2024-01-24 DIAGNOSIS — C85.80 MARGINAL ZONE LYMPHOMA (HCC): ICD-10-CM

## 2024-01-24 LAB
ALBUMIN SERPL-MCNC: 4 G/DL (ref 3.5–5.2)
ALP SERPL-CCNC: 75 U/L (ref 35–104)
ALT SERPL-CCNC: 24 U/L (ref 5–33)
ANION GAP SERPL CALCULATED.3IONS-SCNC: 14 MMOL/L (ref 7–19)
AST SERPL-CCNC: 25 U/L (ref 5–32)
BASOPHILS # BLD: 0 K/UL (ref 0–0.2)
BASOPHILS NFR BLD: 0.4 % (ref 0–1)
BILIRUB SERPL-MCNC: 0.6 MG/DL (ref 0.2–1.2)
BUN SERPL-MCNC: 28 MG/DL (ref 8–23)
CALCIUM SERPL-MCNC: 10 MG/DL (ref 8.8–10.2)
CHLORIDE SERPL-SCNC: 104 MMOL/L (ref 98–111)
CO2 SERPL-SCNC: 26 MMOL/L (ref 22–29)
CREAT SERPL-MCNC: 1.4 MG/DL (ref 0.5–0.9)
EOSINOPHIL # BLD: 0.1 K/UL (ref 0–0.6)
EOSINOPHIL NFR BLD: 0.8 % (ref 0–5)
ERYTHROCYTE [DISTWIDTH] IN BLOOD BY AUTOMATED COUNT: 16.8 % (ref 11.5–14.5)
GLUCOSE SERPL-MCNC: 112 MG/DL (ref 74–109)
HCT VFR BLD AUTO: 38.5 % (ref 37–47)
IMM GRANULOCYTES # BLD: 0 K/UL
LYMPHOCYTES # BLD: 2 K/UL (ref 1.1–4.5)
LYMPHOCYTES NFR BLD: 23.4 % (ref 20–40)
MCH RBC QN AUTO: 29.5 PG (ref 27–31)
MCHC RBC AUTO-ENTMCNC: 30.4 G/DL (ref 33–37)
MCV RBC AUTO: 97.2 FL (ref 81–99)
MONOCYTES # BLD: 0.8 K/UL (ref 0–0.9)
MONOCYTES NFR BLD: 9.1 % (ref 0–10)
NEUTROPHILS # BLD: 5.5 K/UL (ref 1.5–7.5)
NEUTS SEG NFR BLD: 66.1 % (ref 50–65)
PLATELET # BLD AUTO: 181 K/UL (ref 130–400)
PMV BLD AUTO: 11 FL (ref 9.4–12.3)
POTASSIUM SERPL-SCNC: 3.9 MMOL/L (ref 3.5–5)
PROT SERPL-MCNC: 6.3 G/DL (ref 6.6–8.7)
RBC # BLD AUTO: 3.96 M/UL (ref 4.2–5.4)
SODIUM SERPL-SCNC: 144 MMOL/L (ref 136–145)
WBC # BLD AUTO: 8.3 K/UL (ref 4.8–10.8)

## 2024-02-19 ENCOUNTER — TELEPHONE (OUTPATIENT)
Dept: HEMATOLOGY | Age: 74
End: 2024-02-19

## 2024-02-19 DIAGNOSIS — C85.80 MARGINAL ZONE LYMPHOMA (HCC): ICD-10-CM

## 2024-02-19 DIAGNOSIS — R53.83 FATIGUE DUE TO TREATMENT: Primary | ICD-10-CM

## 2024-02-19 NOTE — PROGRESS NOTES
MEDICAL ONCOLOGY PROGRESS NOTE    Pt Name: Joan Coates  MRN: [id number]  YOB: 1950  Date of evaluation: 2/21/2024    HISTORY OF PRESENT ILLNESS:   Reason for MD visit-toxicity assessed/disease management  The patient is current on acalabrutinib 100 mg p.o. twice daily Monday through Wednesday, 100 mg p.o. daily Thursday-Sunday.  She has tolerated treatment relatively well except for complaints of fatigue.  Denies hypertension.  Denies diarrhea.  Denies any nausea.  Denies any headaches.  She had repeat CT scans to assess disease status.Denies any palpitations. Feels fatigued.  Next scan in May, 24.  She denies any headaches.  Denies any easy bruising.    Diagnosis  Marginal zone lymphoma, extranodal, right lower lung, 2006  Consistent with small B-cell lymphoma  Recurrence paraspinal mass close to her right kidney, 2010  Recurrence with a mediastinal mass, 2013  Recurrence was consistent with marginal zone lymphoma, Oct 2018  CKD Stage 4, Sept 2023     Treatment Summary  2006 Resection of the RLL and RML  2006 Radiation 3000 cGy  2013 Rituxan and bendamustine x 2 cycles and then switched to R-CVP x 4 cycles due to cytopenias followed by Rituxan maintenance for one year  2018 -04/2019 Obinutuzumab  6/26/19-2/24/21 ME-401+ rituximab (CR, stopped due to diarrhea/colitis)  11/2021-4/25/22 Ibrutinib (discontinued due to recurrent hematuria, hypokalemia and AFRVR)  07/2022 Initiated Acalabrutinib 100 mg BID  5/22/2023-Acalabrutinib 100 mg p.o. twice daily Monday through Wednesday, 100 mg p.o. daily Thursday-Sunday    Cancer History  Mrs. Coates is a very pleasant 73 y.o. patient who reports to clinic for a second opinon regarding her diagnosis of lymphoma. She was initially diagnosed with with extranodal marginal zone lymphoma of mucosa associalted lymphoid tissue (MALT) of the right lower lung in 2006. Her pathology was reviewed at [facility] and was consistent with small B-cell lymphoma. 
119

## 2024-02-19 NOTE — TELEPHONE ENCOUNTER
Called patient to remind of appointment on 02/21/2024 and was unable to leave a message or talk to patient due the phone kept ringing and no one ever answered or no option to leave voicemail.

## 2024-02-21 ENCOUNTER — OFFICE VISIT (OUTPATIENT)
Dept: HEMATOLOGY | Age: 74
End: 2024-02-21

## 2024-02-21 ENCOUNTER — HOSPITAL ENCOUNTER (OUTPATIENT)
Dept: INFUSION THERAPY | Age: 74
Discharge: HOME | End: 2024-02-21
Payer: MEDICARE

## 2024-02-21 VITALS
WEIGHT: 164.4 LBS | OXYGEN SATURATION: 94 % | TEMPERATURE: 97.5 F | HEIGHT: 62 IN | BODY MASS INDEX: 30.25 KG/M2 | DIASTOLIC BLOOD PRESSURE: 88 MMHG | HEART RATE: 85 BPM | SYSTOLIC BLOOD PRESSURE: 148 MMHG

## 2024-02-21 DIAGNOSIS — Z71.89 CARE PLAN DISCUSSED WITH PATIENT: ICD-10-CM

## 2024-02-21 DIAGNOSIS — N18.31 STAGE 3A CHRONIC KIDNEY DISEASE (HCC): ICD-10-CM

## 2024-02-21 DIAGNOSIS — G62.0 CHEMOTHERAPY-INDUCED NEUROPATHY (HCC): ICD-10-CM

## 2024-02-21 DIAGNOSIS — C85.80 MARGINAL ZONE LYMPHOMA (HCC): ICD-10-CM

## 2024-02-21 DIAGNOSIS — R53.83 OTHER FATIGUE: ICD-10-CM

## 2024-02-21 DIAGNOSIS — C85.80 MARGINAL ZONE LYMPHOMA (HCC): Primary | ICD-10-CM

## 2024-02-21 DIAGNOSIS — T45.1X5S ANTINEOPLASTIC DRUGS CAUSING ADVERSE EFFECT, SEQUELA: ICD-10-CM

## 2024-02-21 DIAGNOSIS — T45.1X5A CHEMOTHERAPY-INDUCED NEUROPATHY (HCC): ICD-10-CM

## 2024-02-21 LAB
ALBUMIN SERPL-MCNC: 4.5 G/DL (ref 3.5–5.2)
ALP SERPL-CCNC: 74 U/L (ref 35–104)
ALT SERPL-CCNC: 34 U/L (ref 9–52)
ANION GAP SERPL CALCULATED.3IONS-SCNC: 10 MMOL/L (ref 7–19)
AST SERPL-CCNC: 37 U/L (ref 14–36)
BASOPHILS # BLD: 0.03 K/UL (ref 0.01–0.08)
BASOPHILS NFR BLD: 0.3 % (ref 0.1–1.2)
BILIRUB SERPL-MCNC: 1.1 MG/DL (ref 0.2–1.3)
BUN SERPL-MCNC: 30 MG/DL (ref 7–17)
CALCIUM SERPL-MCNC: 10.1 MG/DL (ref 8.4–10.2)
CHLORIDE SERPL-SCNC: 105 MMOL/L (ref 98–111)
CO2 SERPL-SCNC: 26 MMOL/L (ref 22–29)
CREAT SERPL-MCNC: 1.5 MG/DL (ref 0.5–1)
EOSINOPHIL # BLD: 0.06 K/UL (ref 0.04–0.54)
EOSINOPHIL NFR BLD: 0.6 % (ref 0.7–7)
ERYTHROCYTE [DISTWIDTH] IN BLOOD BY AUTOMATED COUNT: 15.4 % (ref 11.7–14.4)
GLOBULIN: 2.5 G/DL
GLUCOSE SERPL-MCNC: 105 MG/DL (ref 74–106)
HCT VFR BLD AUTO: 38.6 % (ref 34.1–44.9)
HGB BLD-MCNC: 12.6 G/DL (ref 11.2–15.7)
LYMPHOCYTES # BLD: 2.17 K/UL (ref 1.18–3.74)
LYMPHOCYTES NFR BLD: 22.8 % (ref 19.3–53.1)
MCH RBC QN AUTO: 30.1 PG (ref 25.6–32.2)
MCHC RBC AUTO-ENTMCNC: 32.6 G/DL (ref 32.3–35.5)
MCV RBC AUTO: 92.3 FL (ref 79.4–94.8)
MONOCYTES # BLD: 0.83 K/UL (ref 0.24–0.82)
MONOCYTES NFR BLD: 8.7 % (ref 4.7–12.5)
NEUTROPHILS # BLD: 6.42 K/UL (ref 1.56–6.13)
NEUTS SEG NFR BLD: 67.4 % (ref 34–71.1)
PLATELET # BLD AUTO: 178 K/UL (ref 182–369)
PMV BLD AUTO: 10.2 FL (ref 7.4–10.4)
POTASSIUM SERPL-SCNC: 4.3 MMOL/L (ref 3.5–5.1)
PROT SERPL-MCNC: 7.1 G/DL (ref 6.3–8.2)
RBC # BLD AUTO: 4.18 M/UL (ref 3.93–5.22)
SODIUM SERPL-SCNC: 141 MMOL/L (ref 137–145)
WBC # BLD AUTO: 9.53 K/UL (ref 3.98–10.04)

## 2024-02-21 PROCEDURE — 36415 COLL VENOUS BLD VENIPUNCTURE: CPT

## 2024-02-21 PROCEDURE — 80053 COMPREHEN METABOLIC PANEL: CPT

## 2024-02-21 PROCEDURE — 85025 COMPLETE CBC W/AUTO DIFF WBC: CPT

## 2024-02-21 PROCEDURE — 99212 OFFICE O/P EST SF 10 MIN: CPT

## 2024-04-03 DIAGNOSIS — C85.80 MARGINAL ZONE LYMPHOMA (HCC): ICD-10-CM

## 2024-04-03 LAB
ALBUMIN SERPL-MCNC: 4.1 G/DL (ref 3.5–5.2)
ALP SERPL-CCNC: 77 U/L (ref 35–104)
ALT SERPL-CCNC: 23 U/L (ref 5–33)
ANION GAP SERPL CALCULATED.3IONS-SCNC: 17 MMOL/L (ref 7–19)
AST SERPL-CCNC: 22 U/L (ref 5–32)
BASOPHILS # BLD: 0 K/UL (ref 0–0.2)
BASOPHILS NFR BLD: 0.4 % (ref 0–1)
BILIRUB SERPL-MCNC: 0.6 MG/DL (ref 0.2–1.2)
BUN SERPL-MCNC: 29 MG/DL (ref 8–23)
CALCIUM SERPL-MCNC: 9.5 MG/DL (ref 8.8–10.2)
CHLORIDE SERPL-SCNC: 105 MMOL/L (ref 98–111)
CO2 SERPL-SCNC: 21 MMOL/L (ref 22–29)
CREAT SERPL-MCNC: 1.5 MG/DL (ref 0.5–0.9)
EOSINOPHIL # BLD: 0.1 K/UL (ref 0–0.6)
EOSINOPHIL NFR BLD: 0.9 % (ref 0–5)
ERYTHROCYTE [DISTWIDTH] IN BLOOD BY AUTOMATED COUNT: 15.9 % (ref 11.5–14.5)
GLUCOSE SERPL-MCNC: 127 MG/DL (ref 74–109)
HCT VFR BLD AUTO: 38.6 % (ref 37–47)
HGB BLD-MCNC: 11.8 G/DL (ref 12–16)
IMM GRANULOCYTES # BLD: 0 K/UL
LYMPHOCYTES # BLD: 1.3 K/UL (ref 1.1–4.5)
LYMPHOCYTES NFR BLD: 18.9 % (ref 20–40)
MCH RBC QN AUTO: 30.1 PG (ref 27–31)
MCHC RBC AUTO-ENTMCNC: 30.6 G/DL (ref 33–37)
MCV RBC AUTO: 98.5 FL (ref 81–99)
MONOCYTES # BLD: 0.6 K/UL (ref 0–0.9)
MONOCYTES NFR BLD: 8.7 % (ref 0–10)
NEUTROPHILS # BLD: 4.8 K/UL (ref 1.5–7.5)
NEUTS SEG NFR BLD: 71 % (ref 50–65)
PLATELET # BLD AUTO: 171 K/UL (ref 130–400)
PMV BLD AUTO: 11.3 FL (ref 9.4–12.3)
POTASSIUM SERPL-SCNC: 4.2 MMOL/L (ref 3.5–5)
PROT SERPL-MCNC: 6.3 G/DL (ref 6.6–8.7)
RBC # BLD AUTO: 3.92 M/UL (ref 4.2–5.4)
SODIUM SERPL-SCNC: 143 MMOL/L (ref 136–145)
WBC # BLD AUTO: 6.8 K/UL (ref 4.8–10.8)

## 2024-05-16 ENCOUNTER — HOSPITAL ENCOUNTER (OUTPATIENT)
Dept: GENERAL RADIOLOGY | Age: 74
Discharge: HOME | End: 2024-05-16
Attending: INTERNAL MEDICINE
Payer: MEDICARE

## 2024-05-16 DIAGNOSIS — C85.80 MARGINAL ZONE LYMPHOMA (HCC): ICD-10-CM

## 2024-05-16 LAB
ALBUMIN SERPL-MCNC: 4.2 G/DL (ref 3.5–5.2)
ALP SERPL-CCNC: 80 U/L (ref 35–104)
ALT SERPL-CCNC: 20 U/L (ref 5–33)
ANION GAP SERPL CALCULATED.3IONS-SCNC: 17 MMOL/L (ref 7–19)
AST SERPL-CCNC: 23 U/L (ref 5–32)
BASOPHILS # BLD: 0.1 K/UL (ref 0–0.2)
BASOPHILS NFR BLD: 0.6 % (ref 0–1)
BILIRUB SERPL-MCNC: 0.7 MG/DL (ref 0.2–1.2)
BUN SERPL-MCNC: 27 MG/DL (ref 8–23)
CALCIUM SERPL-MCNC: 10.1 MG/DL (ref 8.8–10.2)
CHLORIDE SERPL-SCNC: 99 MMOL/L (ref 98–111)
CO2 SERPL-SCNC: 24 MMOL/L (ref 22–29)
CREAT SERPL-MCNC: 1.4 MG/DL (ref 0.5–0.9)
EOSINOPHIL # BLD: 0 K/UL (ref 0–0.6)
EOSINOPHIL NFR BLD: 0.4 % (ref 0–5)
ERYTHROCYTE [DISTWIDTH] IN BLOOD BY AUTOMATED COUNT: 16.4 % (ref 11.5–14.5)
GLUCOSE SERPL-MCNC: 115 MG/DL (ref 74–109)
HCT VFR BLD AUTO: 39.1 % (ref 37–47)
HGB BLD-MCNC: 12.3 G/DL (ref 12–16)
IMM GRANULOCYTES # BLD: 0 K/UL
LYMPHOCYTES # BLD: 1.6 K/UL (ref 1.1–4.5)
LYMPHOCYTES NFR BLD: 20.4 % (ref 20–40)
MCH RBC QN AUTO: 30.6 PG (ref 27–31)
MCHC RBC AUTO-ENTMCNC: 31.5 G/DL (ref 33–37)
MCV RBC AUTO: 97.3 FL (ref 81–99)
MONOCYTES # BLD: 0.7 K/UL (ref 0–0.9)
MONOCYTES NFR BLD: 8.2 % (ref 0–10)
NEUTROPHILS # BLD: 5.5 K/UL (ref 1.5–7.5)
NEUTS SEG NFR BLD: 70.1 % (ref 50–65)
PLATELET # BLD AUTO: 214 K/UL (ref 130–400)
PMV BLD AUTO: 11.1 FL (ref 9.4–12.3)
POTASSIUM SERPL-SCNC: 4.3 MMOL/L (ref 3.5–5)
PROT SERPL-MCNC: 6.7 G/DL (ref 6.6–8.7)
RBC # BLD AUTO: 4.02 M/UL (ref 4.2–5.4)
SODIUM SERPL-SCNC: 140 MMOL/L (ref 136–145)
WBC # BLD AUTO: 7.9 K/UL (ref 4.8–10.8)

## 2024-05-16 PROCEDURE — 71250 CT THORAX DX C-: CPT

## 2024-05-16 PROCEDURE — 2500000003 HC RX 250 WO HCPCS: Performed by: INTERNAL MEDICINE

## 2024-05-16 PROCEDURE — 74176 CT ABD & PELVIS W/O CONTRAST: CPT

## 2024-05-16 RX ADMIN — BARIUM SULFATE 450 ML: 21 SUSPENSION ORAL at 11:26

## 2024-05-17 ENCOUNTER — TELEPHONE (OUTPATIENT)
Dept: HEMATOLOGY | Age: 74
End: 2024-05-17

## 2024-05-17 NOTE — TELEPHONE ENCOUNTER
I called to remind patient of appt on 05/22/24. I left detailed vm with appt date and time and I also made pt aware of our call back number in case they needed to call back and reschedule.

## 2024-05-20 DIAGNOSIS — C85.80 MARGINAL ZONE LYMPHOMA (HCC): Primary | ICD-10-CM

## 2024-05-20 NOTE — PROGRESS NOTES
MEDICAL ONCOLOGY PROGRESS NOTE    Pt Name: Joan Coates  MRN: [id number]  YOB: 1950  Date of evaluation: 5/22/2024    HISTORY OF PRESENT ILLNESS:   Reason for MD visit-toxicity assessed/disease management  The patient is current on acalabrutinib 100 mg p.o. twice daily Monday through Wednesday, 100 mg p.o. daily Thursday-Sunday.  She has tolerated treatment relatively well except for complaints of fatigue.  Denies hypertension.  Denies diarrhea.  Denies any nausea.  Denies any headaches.  She had repeat CT scans to assess disease status.Denies any palpitations. Feels fatigued.  Next scan in May, 24.  She denies any headaches.  Denies any easy bruising.  She had repeat CT chest abdomen pelvis to assess response.    Diagnosis  Marginal zone lymphoma, extranodal, right lower lung, 2006  Consistent with small B-cell lymphoma  Recurrence paraspinal mass close to her right kidney, 2010  Recurrence with a mediastinal mass, 2013  Recurrence was consistent with marginal zone lymphoma, Oct 2018  CKD Stage 4, Sept 2023     Treatment Summary  2006 Resection of the RLL and RML  2006 Radiation 3000 cGy  2013 Rituxan and bendamustine x 2 cycles and then switched to R-CVP x 4 cycles due to cytopenias followed by Rituxan maintenance for one year  2018 -04/2019 Obinutuzumab  6/26/19-2/24/21 ME-401+ rituximab (CR, stopped due to diarrhea/colitis)  11/2021-4/25/22 Ibrutinib (discontinued due to recurrent hematuria, hypokalemia and AFRVR)  07/2022 Initiated Acalabrutinib 100 mg BID  5/22/2023-Acalabrutinib 100 mg p.o. twice daily Monday through Wednesday, 100 mg p.o. daily Thursday-Sunday 5/22/24 Decrease to Acalabrutinib 100mg once daily    Cancer History  Mrs. Coates is a very pleasant 73 y.o. patient who reports to clinic for a second opinon regarding her diagnosis of lymphoma. She was initially diagnosed with with extranodal marginal zone lymphoma of mucosa associalted lymphoid tissue (MALT) of the right lower lung in

## 2024-05-22 ENCOUNTER — HOSPITAL ENCOUNTER (OUTPATIENT)
Dept: INFUSION THERAPY | Age: 74
Discharge: HOME | End: 2024-05-22
Payer: MEDICARE

## 2024-05-22 ENCOUNTER — OFFICE VISIT (OUTPATIENT)
Dept: HEMATOLOGY | Age: 74
End: 2024-05-22
Payer: MEDICARE

## 2024-05-22 VITALS
BODY MASS INDEX: 29.94 KG/M2 | SYSTOLIC BLOOD PRESSURE: 120 MMHG | TEMPERATURE: 98.3 F | WEIGHT: 162.7 LBS | HEART RATE: 72 BPM | OXYGEN SATURATION: 97 % | DIASTOLIC BLOOD PRESSURE: 88 MMHG | HEIGHT: 62 IN

## 2024-05-22 DIAGNOSIS — C85.80 MARGINAL ZONE LYMPHOMA (HCC): ICD-10-CM

## 2024-05-22 DIAGNOSIS — R53.83 OTHER FATIGUE: ICD-10-CM

## 2024-05-22 DIAGNOSIS — Z71.89 CARE PLAN DISCUSSED WITH PATIENT: Primary | ICD-10-CM

## 2024-05-22 DIAGNOSIS — T45.1X5S ANTINEOPLASTIC DRUGS CAUSING ADVERSE EFFECT, SEQUELA: ICD-10-CM

## 2024-05-22 LAB
ALBUMIN SERPL-MCNC: 4.6 G/DL (ref 3.5–5.2)
ALP SERPL-CCNC: 72 U/L (ref 35–104)
ALT SERPL-CCNC: 22 U/L (ref 9–52)
ANION GAP SERPL CALCULATED.3IONS-SCNC: 12 MMOL/L (ref 7–19)
AST SERPL-CCNC: 28 U/L (ref 14–36)
BASOPHILS # BLD: 0.04 K/UL (ref 0.01–0.08)
BASOPHILS NFR BLD: 0.6 % (ref 0.1–1.2)
BILIRUB SERPL-MCNC: 0.8 MG/DL (ref 0.2–1.3)
BUN SERPL-MCNC: 29 MG/DL (ref 7–17)
CALCIUM SERPL-MCNC: 9.6 MG/DL (ref 8.4–10.2)
CHLORIDE SERPL-SCNC: 101 MMOL/L (ref 98–111)
CO2 SERPL-SCNC: 24 MMOL/L (ref 22–29)
CREAT SERPL-MCNC: 1.3 MG/DL (ref 0.5–1)
EOSINOPHIL # BLD: 0.05 K/UL (ref 0.04–0.54)
EOSINOPHIL NFR BLD: 0.7 % (ref 0.7–7)
ERYTHROCYTE [DISTWIDTH] IN BLOOD BY AUTOMATED COUNT: 15.9 % (ref 11.7–14.4)
GLOBULIN: 1.9 G/DL
GLUCOSE SERPL-MCNC: 111 MG/DL (ref 74–106)
HCT VFR BLD AUTO: 35.5 % (ref 34.1–44.9)
HGB BLD-MCNC: 11.8 G/DL (ref 11.2–15.7)
LDH SERPL-CCNC: 139 U/L (ref 120–246)
LYMPHOCYTES # BLD: 1.54 K/UL (ref 1.18–3.74)
LYMPHOCYTES NFR BLD: 21.7 % (ref 19.3–53.1)
MCH RBC QN AUTO: 31.1 PG (ref 25.6–32.2)
MCHC RBC AUTO-ENTMCNC: 33.2 G/DL (ref 32.3–35.5)
MCV RBC AUTO: 93.7 FL (ref 79.4–94.8)
MONOCYTES # BLD: 0.65 K/UL (ref 0.24–0.82)
MONOCYTES NFR BLD: 9.1 % (ref 4.7–12.5)
NEUTROPHILS # BLD: 4.8 K/UL (ref 1.56–6.13)
NEUTS SEG NFR BLD: 67.5 % (ref 34–71.1)
PLATELET # BLD AUTO: 164 K/UL (ref 182–369)
PMV BLD AUTO: 9.3 FL (ref 7.4–10.4)
POTASSIUM SERPL-SCNC: 4 MMOL/L (ref 3.5–5.1)
PROT SERPL-MCNC: 6.5 G/DL (ref 6.3–8.2)
RBC # BLD AUTO: 3.79 M/UL (ref 3.93–5.22)
SODIUM SERPL-SCNC: 137 MMOL/L (ref 137–145)
URATE SERPL-MCNC: 7.2 MG/DL (ref 2.5–5.2)
WBC # BLD AUTO: 7.11 K/UL (ref 3.98–10.04)

## 2024-05-22 PROCEDURE — G8427 DOCREV CUR MEDS BY ELIG CLIN: HCPCS | Performed by: INTERNAL MEDICINE

## 2024-05-22 PROCEDURE — 80053 COMPREHEN METABOLIC PANEL: CPT

## 2024-05-22 PROCEDURE — 1123F ACP DISCUSS/DSCN MKR DOCD: CPT | Performed by: INTERNAL MEDICINE

## 2024-05-22 PROCEDURE — 99214 OFFICE O/P EST MOD 30 MIN: CPT | Performed by: INTERNAL MEDICINE

## 2024-05-22 PROCEDURE — 99212 OFFICE O/P EST SF 10 MIN: CPT

## 2024-05-22 PROCEDURE — 36415 COLL VENOUS BLD VENIPUNCTURE: CPT

## 2024-05-22 PROCEDURE — 3017F COLORECTAL CA SCREEN DOC REV: CPT | Performed by: INTERNAL MEDICINE

## 2024-05-22 PROCEDURE — G8400 PT W/DXA NO RESULTS DOC: HCPCS | Performed by: INTERNAL MEDICINE

## 2024-05-22 PROCEDURE — 84550 ASSAY OF BLOOD/URIC ACID: CPT

## 2024-05-22 PROCEDURE — 83615 LACTATE (LD) (LDH) ENZYME: CPT

## 2024-05-22 PROCEDURE — 1036F TOBACCO NON-USER: CPT | Performed by: INTERNAL MEDICINE

## 2024-05-22 PROCEDURE — 1090F PRES/ABSN URINE INCON ASSESS: CPT | Performed by: INTERNAL MEDICINE

## 2024-05-22 PROCEDURE — G2211 COMPLEX E/M VISIT ADD ON: HCPCS | Performed by: INTERNAL MEDICINE

## 2024-05-22 PROCEDURE — G8417 CALC BMI ABV UP PARAM F/U: HCPCS | Performed by: INTERNAL MEDICINE

## 2024-05-22 PROCEDURE — 85025 COMPLETE CBC W/AUTO DIFF WBC: CPT

## 2024-06-17 DIAGNOSIS — C85.80 MARGINAL ZONE LYMPHOMA (HCC): ICD-10-CM

## 2024-06-17 RX ORDER — ACALABRUTINIB 100 MG/1
100 CAPSULE, GELATIN COATED ORAL DAILY
Qty: 30 CAPSULE | Refills: 5 | Status: ACTIVE | OUTPATIENT
Start: 2024-06-17

## 2024-06-26 DIAGNOSIS — C85.80 MARGINAL ZONE LYMPHOMA (HCC): ICD-10-CM

## 2024-06-26 LAB
25(OH)D3 SERPL-MCNC: 58.8 NG/ML
ALBUMIN SERPL-MCNC: 4 G/DL (ref 3.5–5.2)
ALP SERPL-CCNC: 74 U/L (ref 35–104)
ALT SERPL-CCNC: 18 U/L (ref 5–33)
ANION GAP SERPL CALCULATED.3IONS-SCNC: 13 MMOL/L (ref 7–19)
AST SERPL-CCNC: 22 U/L (ref 5–32)
BASOPHILS # BLD: 0 K/UL (ref 0–0.2)
BASOPHILS NFR BLD: 0.3 % (ref 0–1)
BILIRUB SERPL-MCNC: 0.9 MG/DL (ref 0.2–1.2)
BUN SERPL-MCNC: 27 MG/DL (ref 8–23)
CALCIUM SERPL-MCNC: 9.4 MG/DL (ref 8.8–10.2)
CHLORIDE SERPL-SCNC: 102 MMOL/L (ref 98–111)
CHOLEST SERPL-MCNC: 160 MG/DL (ref 160–199)
CO2 SERPL-SCNC: 25 MMOL/L (ref 22–29)
CREAT SERPL-MCNC: 1.5 MG/DL (ref 0.5–0.9)
EOSINOPHIL # BLD: 0.1 K/UL (ref 0–0.6)
EOSINOPHIL NFR BLD: 0.9 % (ref 0–5)
ERYTHROCYTE [DISTWIDTH] IN BLOOD BY AUTOMATED COUNT: 15.9 % (ref 11.5–14.5)
GLUCOSE SERPL-MCNC: 104 MG/DL (ref 74–109)
HCT VFR BLD AUTO: 36.7 % (ref 37–47)
HDLC SERPL-MCNC: 45 MG/DL (ref 65–121)
HGB BLD-MCNC: 11.5 G/DL (ref 12–16)
IMM GRANULOCYTES # BLD: 0 K/UL
LDLC SERPL CALC-MCNC: 77 MG/DL
LYMPHOCYTES # BLD: 1.2 K/UL (ref 1.1–4.5)
LYMPHOCYTES NFR BLD: 21.3 % (ref 20–40)
MAGNESIUM SERPL-MCNC: 2 MG/DL (ref 1.6–2.4)
MCH RBC QN AUTO: 30.6 PG (ref 27–31)
MCHC RBC AUTO-ENTMCNC: 31.3 G/DL (ref 33–37)
MCV RBC AUTO: 97.6 FL (ref 81–99)
MONOCYTES # BLD: 0.5 K/UL (ref 0–0.9)
MONOCYTES NFR BLD: 9.3 % (ref 0–10)
NEUTROPHILS # BLD: 3.9 K/UL (ref 1.5–7.5)
NEUTS SEG NFR BLD: 67.9 % (ref 50–65)
PLATELET # BLD AUTO: 173 K/UL (ref 130–400)
PMV BLD AUTO: 11 FL (ref 9.4–12.3)
POTASSIUM SERPL-SCNC: 3.8 MMOL/L (ref 3.5–5)
PROT SERPL-MCNC: 6.1 G/DL (ref 6.6–8.7)
RBC # BLD AUTO: 3.76 M/UL (ref 4.2–5.4)
SODIUM SERPL-SCNC: 140 MMOL/L (ref 136–145)
TRIGL SERPL-MCNC: 188 MG/DL (ref 0–149)
TSH SERPL DL<=0.005 MIU/L-ACNC: 4.26 UIU/ML (ref 0.27–4.2)
WBC # BLD AUTO: 5.8 K/UL (ref 4.8–10.8)

## 2024-08-07 DIAGNOSIS — C85.80 MARGINAL ZONE LYMPHOMA (HCC): ICD-10-CM

## 2024-08-07 LAB
ALBUMIN SERPL-MCNC: 4.2 G/DL (ref 3.5–5.2)
ALP SERPL-CCNC: 75 U/L (ref 35–104)
ALT SERPL-CCNC: 19 U/L (ref 5–33)
ANION GAP SERPL CALCULATED.3IONS-SCNC: 14 MMOL/L (ref 7–19)
AST SERPL-CCNC: 21 U/L (ref 5–32)
BILIRUB SERPL-MCNC: 0.5 MG/DL (ref 0.2–1.2)
BUN SERPL-MCNC: 28 MG/DL (ref 8–23)
CALCIUM SERPL-MCNC: 9.8 MG/DL (ref 8.8–10.2)
CHLORIDE SERPL-SCNC: 101 MMOL/L (ref 98–111)
CO2 SERPL-SCNC: 25 MMOL/L (ref 22–29)
CREAT SERPL-MCNC: 1.5 MG/DL (ref 0.5–0.9)
GLUCOSE SERPL-MCNC: 111 MG/DL (ref 74–109)
POTASSIUM SERPL-SCNC: 4 MMOL/L (ref 3.5–5)
PROT SERPL-MCNC: 6.4 G/DL (ref 6.6–8.7)
SODIUM SERPL-SCNC: 140 MMOL/L (ref 136–145)

## 2024-08-20 DIAGNOSIS — C85.80 MARGINAL ZONE LYMPHOMA (HCC): Primary | ICD-10-CM

## 2024-08-20 NOTE — PROGRESS NOTES
hilum with extension along the UPJ/proximal left ureter with slightly increased left lower pole hydronephrosis. Note that direct comparison is slightly limited in the absence of intravenous contrast on prior examinations. No other etiology for patient's reported hematuria is identified. No renal calculi or other suspicious renal or visible upper or lower tract urothelial lesion on slightly limited assessment. Endometrial thickening to 1.4 cm, greater than expected for a post menopausal patient. Confirmation with pelvic ultrasound is recommended.   11/2021-4/25/22 Ibrutinib (discontinued due to recurrent hematuria, hypokalemia and AFRVR)  5/31/22 Echo ([facility]): Left ventricular systolic function is normal. Normal size and function of the right ventricle. No significant valvular pathology. Dilated left atrium. Small left atrial appendage, with apparent \"chicken wing\" anatomy. Left atrial appendage ostial width and coaxial depth measurements provided below; appears borderline for successful implant of 20 mm Watchman FLX device. No evidence of thrombus. Small (6 mm) atrial septal defect noted near inferior-posterior aspect of interatrial septum, with left-to-right flow observed on color Doppler.   7/7/22 CT chest, abdomen, pelvis w/o ([facility]):  Soft tissue mass at the left renal hilum with extension along the proximal left ureter has not changed appreciably since 3/1/2022, although evaluation is somewhat limited due to lack of intravenous contrast. As mentioned on the report for the 3/1/2022 CT, this mass showed probable enlargement when compared to the 1/31/2022 CT. Prior right middle and lower lobectomies. Mildly dilated main pulmonary artery, which may be due to pulmonary arterial hypertension. No new sites of lymphoma in the chest, abdomen or pelvis. Multiple other chronic and incidental findings as above.   07/2022 - Initiated Acalabrutinib 100 mg BID  8/24/22 Echo ([facility]): Left ventricular systolic function is normal.

## 2024-08-21 ENCOUNTER — HOSPITAL ENCOUNTER (OUTPATIENT)
Dept: INFUSION THERAPY | Age: 74
Discharge: HOME | End: 2024-08-21
Payer: MEDICARE

## 2024-08-21 ENCOUNTER — OFFICE VISIT (OUTPATIENT)
Dept: HEMATOLOGY | Age: 74
End: 2024-08-21
Payer: MEDICARE

## 2024-08-21 VITALS
WEIGHT: 162.4 LBS | SYSTOLIC BLOOD PRESSURE: 134 MMHG | HEART RATE: 85 BPM | TEMPERATURE: 97.5 F | BODY MASS INDEX: 29.88 KG/M2 | DIASTOLIC BLOOD PRESSURE: 72 MMHG | OXYGEN SATURATION: 96 % | HEIGHT: 62 IN

## 2024-08-21 DIAGNOSIS — R53.83 OTHER FATIGUE: ICD-10-CM

## 2024-08-21 DIAGNOSIS — T45.1X5A CHEMOTHERAPY-INDUCED NEUROPATHY (HCC): ICD-10-CM

## 2024-08-21 DIAGNOSIS — C85.80 MARGINAL ZONE LYMPHOMA (HCC): Primary | ICD-10-CM

## 2024-08-21 DIAGNOSIS — N28.9 RENAL IMPAIRMENT: ICD-10-CM

## 2024-08-21 DIAGNOSIS — C85.80 MARGINAL ZONE LYMPHOMA (HCC): ICD-10-CM

## 2024-08-21 DIAGNOSIS — Z71.89 CARE PLAN DISCUSSED WITH PATIENT: ICD-10-CM

## 2024-08-21 DIAGNOSIS — G62.0 CHEMOTHERAPY-INDUCED NEUROPATHY (HCC): ICD-10-CM

## 2024-08-21 LAB
ALBUMIN SERPL-MCNC: 4.2 G/DL (ref 3.5–5.2)
ALP SERPL-CCNC: 72 U/L (ref 35–104)
ALT SERPL-CCNC: 18 U/L (ref 5–33)
ANION GAP SERPL CALCULATED.3IONS-SCNC: 12 MMOL/L (ref 7–19)
AST SERPL-CCNC: 24 U/L (ref 5–32)
BASOPHILS # BLD: 0.03 K/UL (ref 0.01–0.08)
BASOPHILS NFR BLD: 0.4 % (ref 0.1–1.2)
BILIRUB SERPL-MCNC: 0.5 MG/DL (ref 0–1.2)
BUN SERPL-MCNC: 26 MG/DL (ref 8–23)
CALCIUM SERPL-MCNC: 9 MG/DL (ref 8.8–10.2)
CHLORIDE SERPL-SCNC: 102 MMOL/L (ref 98–107)
CO2 SERPL-SCNC: 25 MMOL/L (ref 22–29)
CREAT SERPL-MCNC: 1.3 MG/DL (ref 0.5–0.9)
EOSINOPHIL # BLD: 0.06 K/UL (ref 0.04–0.54)
EOSINOPHIL NFR BLD: 0.8 % (ref 0.7–7)
ERYTHROCYTE [DISTWIDTH] IN BLOOD BY AUTOMATED COUNT: 15.8 % (ref 11.7–14.4)
GLUCOSE SERPL-MCNC: 111 MG/DL (ref 70–99)
HCT VFR BLD AUTO: 38.1 % (ref 34.1–44.9)
HGB BLD-MCNC: 12.3 G/DL (ref 11.2–15.7)
LDH SERPL-CCNC: 137 U/L (ref 135–214)
LYMPHOCYTES # BLD: 1.42 K/UL (ref 1.18–3.74)
LYMPHOCYTES NFR BLD: 19.5 % (ref 19.3–53.1)
MCH RBC QN AUTO: 30.1 PG (ref 25.6–32.2)
MCHC RBC AUTO-ENTMCNC: 32.3 G/DL (ref 32.3–35.5)
MCV RBC AUTO: 93.4 FL (ref 79.4–94.8)
MONOCYTES # BLD: 0.74 K/UL (ref 0.24–0.82)
MONOCYTES NFR BLD: 10.2 % (ref 4.7–12.5)
NEUTROPHILS # BLD: 5.01 K/UL (ref 1.56–6.13)
NEUTS SEG NFR BLD: 69 % (ref 34–71.1)
PLATELET # BLD AUTO: 175 K/UL (ref 182–369)
PMV BLD AUTO: 9.5 FL (ref 7.4–10.4)
POTASSIUM SERPL-SCNC: 4 MMOL/L (ref 3.5–5.1)
PROT SERPL-MCNC: 6.5 G/DL (ref 6.4–8.3)
RBC # BLD AUTO: 4.08 M/UL (ref 3.93–5.22)
SODIUM SERPL-SCNC: 139 MMOL/L (ref 136–145)
URATE SERPL-MCNC: 6 MG/DL (ref 2.4–5.7)
WBC # BLD AUTO: 7.27 K/UL (ref 3.98–10.04)

## 2024-08-21 PROCEDURE — 84550 ASSAY OF BLOOD/URIC ACID: CPT

## 2024-08-21 PROCEDURE — 36415 COLL VENOUS BLD VENIPUNCTURE: CPT

## 2024-08-21 PROCEDURE — 1090F PRES/ABSN URINE INCON ASSESS: CPT | Performed by: INTERNAL MEDICINE

## 2024-08-21 PROCEDURE — 1036F TOBACCO NON-USER: CPT | Performed by: INTERNAL MEDICINE

## 2024-08-21 PROCEDURE — 3017F COLORECTAL CA SCREEN DOC REV: CPT | Performed by: INTERNAL MEDICINE

## 2024-08-21 PROCEDURE — 83615 LACTATE (LD) (LDH) ENZYME: CPT

## 2024-08-21 PROCEDURE — 1123F ACP DISCUSS/DSCN MKR DOCD: CPT | Performed by: INTERNAL MEDICINE

## 2024-08-21 PROCEDURE — G8417 CALC BMI ABV UP PARAM F/U: HCPCS | Performed by: INTERNAL MEDICINE

## 2024-08-21 PROCEDURE — 99214 OFFICE O/P EST MOD 30 MIN: CPT | Performed by: INTERNAL MEDICINE

## 2024-08-21 PROCEDURE — 85025 COMPLETE CBC W/AUTO DIFF WBC: CPT

## 2024-08-21 PROCEDURE — 99214 OFFICE O/P EST MOD 30 MIN: CPT

## 2024-08-21 PROCEDURE — G8427 DOCREV CUR MEDS BY ELIG CLIN: HCPCS | Performed by: INTERNAL MEDICINE

## 2024-08-21 PROCEDURE — G8400 PT W/DXA NO RESULTS DOC: HCPCS | Performed by: INTERNAL MEDICINE

## 2024-08-21 PROCEDURE — G2211 COMPLEX E/M VISIT ADD ON: HCPCS | Performed by: INTERNAL MEDICINE

## 2024-08-21 PROCEDURE — 80053 COMPREHEN METABOLIC PANEL: CPT

## 2024-08-21 RX ORDER — DAPAGLIFLOZIN 10 MG/1
10 TABLET, FILM COATED ORAL DAILY
COMMUNITY
Start: 2024-07-16

## 2024-09-12 DIAGNOSIS — C85.80 MARGINAL ZONE LYMPHOMA (HCC): ICD-10-CM

## 2024-09-12 RX ORDER — ACALABRUTINIB 100 MG/1
100 CAPSULE, GELATIN COATED ORAL DAILY
Qty: 30 CAPSULE | Refills: 5 | Status: ACTIVE | OUTPATIENT
Start: 2024-09-12

## 2024-10-02 DIAGNOSIS — C85.80 MARGINAL ZONE LYMPHOMA (HCC): Primary | ICD-10-CM

## 2024-10-07 DIAGNOSIS — C85.80 MARGINAL ZONE LYMPHOMA (HCC): ICD-10-CM

## 2024-10-07 LAB
ALBUMIN SERPL-MCNC: 4.2 G/DL (ref 3.5–5.2)
ALP SERPL-CCNC: 73 U/L (ref 35–104)
ALT SERPL-CCNC: 20 U/L (ref 5–33)
ANION GAP SERPL CALCULATED.3IONS-SCNC: 12 MMOL/L (ref 7–19)
AST SERPL-CCNC: 25 U/L (ref 5–32)
BASOPHILS # BLD: 0 K/UL (ref 0–0.2)
BASOPHILS NFR BLD: 0.3 % (ref 0–1)
BILIRUB SERPL-MCNC: 0.6 MG/DL (ref 0.2–1.2)
BUN SERPL-MCNC: 25 MG/DL (ref 8–23)
CALCIUM SERPL-MCNC: 9.4 MG/DL (ref 8.8–10.2)
CHLORIDE SERPL-SCNC: 104 MMOL/L (ref 98–111)
CO2 SERPL-SCNC: 26 MMOL/L (ref 22–29)
CREAT SERPL-MCNC: 1.5 MG/DL (ref 0.5–0.9)
EOSINOPHIL # BLD: 0.1 K/UL (ref 0–0.6)
EOSINOPHIL NFR BLD: 1.2 % (ref 0–5)
ERYTHROCYTE [DISTWIDTH] IN BLOOD BY AUTOMATED COUNT: 16.7 % (ref 11.5–14.5)
GLUCOSE SERPL-MCNC: 114 MG/DL (ref 70–99)
HCT VFR BLD AUTO: 40.5 % (ref 37–47)
HGB BLD-MCNC: 12.4 G/DL (ref 12–16)
IMM GRANULOCYTES # BLD: 0 K/UL
LYMPHOCYTES # BLD: 1.4 K/UL (ref 1.1–4.5)
LYMPHOCYTES NFR BLD: 21.1 % (ref 20–40)
MCH RBC QN AUTO: 29.9 PG (ref 27–31)
MCHC RBC AUTO-ENTMCNC: 30.6 G/DL (ref 33–37)
MCV RBC AUTO: 97.6 FL (ref 81–99)
MONOCYTES # BLD: 0.7 K/UL (ref 0–0.9)
MONOCYTES NFR BLD: 10.4 % (ref 0–10)
NEUTROPHILS # BLD: 4.4 K/UL (ref 1.5–7.5)
NEUTS SEG NFR BLD: 66.8 % (ref 50–65)
PLATELET # BLD AUTO: 165 K/UL (ref 130–400)
PMV BLD AUTO: 11.1 FL (ref 9.4–12.3)
POTASSIUM SERPL-SCNC: 3.8 MMOL/L (ref 3.5–5)
PROT SERPL-MCNC: 6.3 G/DL (ref 6.4–8.3)
RBC # BLD AUTO: 4.15 M/UL (ref 4.2–5.4)
SODIUM SERPL-SCNC: 142 MMOL/L (ref 136–145)
WBC # BLD AUTO: 6.6 K/UL (ref 4.8–10.8)

## 2024-11-06 DIAGNOSIS — C85.80 MARGINAL ZONE LYMPHOMA (HCC): ICD-10-CM

## 2024-11-13 DIAGNOSIS — C85.80 MARGINAL ZONE LYMPHOMA (HCC): Primary | ICD-10-CM

## 2024-11-13 PROCEDURE — 36415 COLL VENOUS BLD VENIPUNCTURE: CPT | Performed by: INTERNAL MEDICINE

## 2024-11-18 ENCOUNTER — HOSPITAL ENCOUNTER (OUTPATIENT)
Dept: GENERAL RADIOLOGY | Age: 74
Discharge: HOME | End: 2024-11-18
Attending: INTERNAL MEDICINE
Payer: MEDICARE

## 2024-11-18 DIAGNOSIS — C85.80 MARGINAL ZONE LYMPHOMA (HCC): ICD-10-CM

## 2024-11-18 PROCEDURE — 71250 CT THORAX DX C-: CPT

## 2024-11-18 PROCEDURE — 74176 CT ABD & PELVIS W/O CONTRAST: CPT

## 2024-11-18 PROCEDURE — 2500000003 HC RX 250 WO HCPCS: Performed by: INTERNAL MEDICINE

## 2024-11-18 RX ADMIN — BARIUM SULFATE 450 ML: 21 SUSPENSION ORAL at 08:53

## 2024-11-20 LAB
LDH SERPL-CCNC: 168 U/L (ref 91–215)
URATE SERPL-MCNC: 6.2 MG/DL (ref 2.4–5.7)

## 2024-12-05 ENCOUNTER — TELEPHONE (OUTPATIENT)
Dept: HEMATOLOGY | Age: 74
End: 2024-12-05

## 2024-12-05 NOTE — TELEPHONE ENCOUNTER

## 2024-12-09 ENCOUNTER — OFFICE VISIT (OUTPATIENT)
Dept: HEMATOLOGY | Age: 74
End: 2024-12-09
Payer: MEDICARE

## 2024-12-09 ENCOUNTER — HOSPITAL ENCOUNTER (OUTPATIENT)
Dept: INFUSION THERAPY | Age: 74
Discharge: HOME | End: 2024-12-09
Payer: MEDICARE

## 2024-12-09 VITALS
OXYGEN SATURATION: 97 % | HEIGHT: 62 IN | BODY MASS INDEX: 29.88 KG/M2 | DIASTOLIC BLOOD PRESSURE: 80 MMHG | WEIGHT: 162.4 LBS | TEMPERATURE: 97.2 F | HEART RATE: 83 BPM | SYSTOLIC BLOOD PRESSURE: 138 MMHG

## 2024-12-09 DIAGNOSIS — R53.83 OTHER FATIGUE: ICD-10-CM

## 2024-12-09 DIAGNOSIS — Z71.89 CARE PLAN DISCUSSED WITH PATIENT: ICD-10-CM

## 2024-12-09 DIAGNOSIS — T45.1X5S ANTINEOPLASTIC DRUGS CAUSING ADVERSE EFFECT, SEQUELA: ICD-10-CM

## 2024-12-09 DIAGNOSIS — C85.80 MARGINAL ZONE LYMPHOMA (HCC): ICD-10-CM

## 2024-12-09 DIAGNOSIS — C85.80 MARGINAL ZONE LYMPHOMA (HCC): Primary | ICD-10-CM

## 2024-12-09 DIAGNOSIS — N28.9 RENAL IMPAIRMENT: ICD-10-CM

## 2024-12-09 LAB
ALBUMIN SERPL-MCNC: 4.1 G/DL (ref 3.5–5.2)
ALP SERPL-CCNC: 76 U/L (ref 35–104)
ALT SERPL-CCNC: 22 U/L (ref 5–33)
ANION GAP SERPL CALCULATED.3IONS-SCNC: 13 MMOL/L (ref 7–19)
AST SERPL-CCNC: 27 U/L (ref 5–32)
BILIRUB SERPL-MCNC: 0.5 MG/DL (ref 0–1.2)
BUN SERPL-MCNC: 26 MG/DL (ref 8–23)
CALCIUM SERPL-MCNC: 9 MG/DL (ref 8.8–10.2)
CHLORIDE SERPL-SCNC: 102 MMOL/L (ref 98–107)
CO2 SERPL-SCNC: 25 MMOL/L (ref 22–29)
CREAT SERPL-MCNC: 1.3 MG/DL (ref 0.5–0.9)
GLUCOSE SERPL-MCNC: 162 MG/DL (ref 70–99)
LDH SERPL-CCNC: 143 U/L (ref 135–214)
POTASSIUM SERPL-SCNC: 3.7 MMOL/L (ref 3.5–5.1)
PROT SERPL-MCNC: 6.4 G/DL (ref 6.4–8.3)
SODIUM SERPL-SCNC: 140 MMOL/L (ref 136–145)
URATE SERPL-MCNC: 5.9 MG/DL (ref 2.4–5.7)

## 2024-12-09 PROCEDURE — 85025 COMPLETE CBC W/AUTO DIFF WBC: CPT

## 2024-12-09 PROCEDURE — 1125F AMNT PAIN NOTED PAIN PRSNT: CPT | Performed by: INTERNAL MEDICINE

## 2024-12-09 PROCEDURE — 1090F PRES/ABSN URINE INCON ASSESS: CPT | Performed by: INTERNAL MEDICINE

## 2024-12-09 PROCEDURE — G2211 COMPLEX E/M VISIT ADD ON: HCPCS | Performed by: INTERNAL MEDICINE

## 2024-12-09 PROCEDURE — 83615 LACTATE (LD) (LDH) ENZYME: CPT

## 2024-12-09 PROCEDURE — 36415 COLL VENOUS BLD VENIPUNCTURE: CPT

## 2024-12-09 PROCEDURE — G8417 CALC BMI ABV UP PARAM F/U: HCPCS | Performed by: INTERNAL MEDICINE

## 2024-12-09 PROCEDURE — G8427 DOCREV CUR MEDS BY ELIG CLIN: HCPCS | Performed by: INTERNAL MEDICINE

## 2024-12-09 PROCEDURE — 84550 ASSAY OF BLOOD/URIC ACID: CPT

## 2024-12-09 PROCEDURE — 1159F MED LIST DOCD IN RCRD: CPT | Performed by: INTERNAL MEDICINE

## 2024-12-09 PROCEDURE — 80053 COMPREHEN METABOLIC PANEL: CPT

## 2024-12-09 PROCEDURE — G8484 FLU IMMUNIZE NO ADMIN: HCPCS | Performed by: INTERNAL MEDICINE

## 2024-12-09 PROCEDURE — 1123F ACP DISCUSS/DSCN MKR DOCD: CPT | Performed by: INTERNAL MEDICINE

## 2024-12-09 PROCEDURE — 99214 OFFICE O/P EST MOD 30 MIN: CPT | Performed by: INTERNAL MEDICINE

## 2024-12-09 PROCEDURE — 1036F TOBACCO NON-USER: CPT | Performed by: INTERNAL MEDICINE

## 2024-12-09 PROCEDURE — G8400 PT W/DXA NO RESULTS DOC: HCPCS | Performed by: INTERNAL MEDICINE

## 2024-12-09 PROCEDURE — 99212 OFFICE O/P EST SF 10 MIN: CPT

## 2024-12-09 NOTE — PROGRESS NOTES
Recommend continued short interval chest CT follow-up. Postsurgical changes of the right lung. Additional findings as detailed above.  5/15/23 CT Abd/Pelvis WO Contrast (Oral) Examination is limited due to lack of IV contrast administration. Mild stranding about the left renal pelvis is nonspecific. Report from prior CT mentions soft tissue about the left renal sinus/proximal ureter. Soft tissue mass at this site is not readily apparent on this study. Direct comparison with the prior study may be beneficial. Otherwise, no acute abnormality or metastatic disease/ lymphadenopathy identified within the abdomen or pelvis. Colonic diverticulosis. Atrophic right kidney. Additional findings as detailed above.  11/14/23 CT Chest WO Contrast  No acute findings in the chest.  No enlarged lymph nodes are identified.   11/14/23 CT Abd/Pelvis WO Contrast (oral) No adenopathy or abdominal mass. Right renal atrophy.  Left renal cyst. Cholelithiasis.  Atherosclerotic aorta.   05/16/24- CT chest, abd/pelvis ([facility]): No lymphadenopathy in the chest, abdomen or pelvis.  No significant new findings since the previous study.       12/9/2024-appears reviewed results CT C/A/P.  I do not see evidence of disease progression.  Awaiting formal radiology report.    Plan:  Essentially, no evidence of disease progression.   Continue acalabrutinib 100 g p.o. daily    Osteoporosis  -Patient currently receiving Prolia with PCP  -Vit D supplement 2000 units daily    History of A-fib status post ibrutinib  -Status post Watchman procedure July 2022  -Currently on aspirin and metoprolol  -No evidence of uncontrolled A-fib.    CKD stage 3b-stable      Latest Ref Rng & Units 12/9/2024    10:49 AM 10/7/2024     8:07 AM 8/21/2024    11:12 AM 8/7/2024     8:33 AM 6/26/2024     8:26 AM   Labs Renal   BUN 8 - 23 mg/dL 26  25  26  28  27    Cr 0.5 - 0.9 mg/dL 1.3  1.5  1.3  1.5  1.5    K 3.5 - 5.1 mmol/L 3.7  3.8  4.0  4.0  3.8    Na 136 - 145 mmol/L 140  142

## 2025-01-22 DIAGNOSIS — C85.80 MARGINAL ZONE LYMPHOMA (HCC): ICD-10-CM

## 2025-01-22 LAB
BASOPHILS # BLD: 0.03 K/UL (ref 0–0.2)
BASOPHILS NFR BLD: 0.4 % (ref 0–1)
EOSINOPHIL # BLD: 0.08 K/UL (ref 0–0.6)
EOSINOPHIL NFR BLD: 1.2 % (ref 0–5)
ERYTHROCYTE [DISTWIDTH] IN BLOOD BY AUTOMATED COUNT: 16.7 % (ref 11.5–14.5)
HCT VFR BLD AUTO: 40.9 % (ref 37–47)
HGB BLD-MCNC: 12.8 G/DL (ref 12–16)
LYMPHOCYTES # BLD: 1.49 K/UL (ref 1.1–4.5)
LYMPHOCYTES NFR BLD: 21.6 % (ref 20–40)
MCH RBC QN AUTO: 29.8 PG (ref 27–31)
MCHC RBC AUTO-ENTMCNC: 31.3 G/DL (ref 33–37)
MCV RBC AUTO: 95.3 FL (ref 81–99)
MONOCYTES # BLD: 0.76 K/UL (ref 0–0.9)
MONOCYTES NFR BLD: 11 % (ref 1–10)
NEUTROPHILS # BLD: 4.5 K/UL (ref 1.5–7.5)
NEUTS SEG NFR BLD: 65.4 % (ref 50–65)
PLATELET # BLD AUTO: 183 K/UL (ref 130–400)
PMV BLD AUTO: 11 FL (ref 9.4–12.3)
RBC # BLD AUTO: 4.29 M/UL (ref 4.2–5.4)
WBC # BLD AUTO: 6.89 K/UL (ref 4.8–10.8)

## 2025-03-03 DIAGNOSIS — C85.80 MARGINAL ZONE LYMPHOMA (HCC): Primary | ICD-10-CM

## 2025-03-03 RX ORDER — ACALABRUTINIB 100 MG/1
100 TABLET, FILM COATED ORAL DAILY
Qty: 30 TABLET | Refills: 5 | Status: ACTIVE | OUTPATIENT
Start: 2025-03-03

## 2025-03-05 ENCOUNTER — TELEPHONE (OUTPATIENT)
Dept: HEMATOLOGY | Age: 75
End: 2025-03-05

## 2025-03-06 DIAGNOSIS — C85.80 MARGINAL ZONE LYMPHOMA (HCC): Primary | ICD-10-CM

## 2025-03-06 NOTE — PROGRESS NOTES
MEDICAL ONCOLOGY PROGRESS NOTE    Pt Name: Joan Coates  MRN: [id number]  YOB: 1950  Date of evaluation: 3/10/2025    HISTORY OF PRESENT ILLNESS:   Reason for MD visit-toxicity assessed/disease management  History of Present Illness  Patient has a history of marginal zone lymphoma currently on acalabrutinib 100 mg p.o. daily.   Mrs. Joan Coates presents to clinic today for routine follow-up. She is currently under ongoing treatment with decreased dosage of Acalbrutinib. She has complaints of increase fatigue and shortness of breath. She does experience diarrhea. She is not taking OTC antidiarrhea medication. She is also experiencing lightheadness and blurry vision. She does state she does have appointment with PCP to be evaluated regarding new onset symptoms. She denies any nausea or vomiting.    She reports experiencing dizziness upon standing, increased weakness, and shortness of breath. She has lost weight, even though she maintains her regular diet, which includes chips, cookies, candy, and meals. She has not changed her eating habits. She is currently on acalabrutinib, which she tolerates well without any adverse effects. She does not experience night sweats but occasionally feels clammy upon waking. She has a scheduled appointment with her primary care physician tomorrow.    She also reports occasional diarrhea, occurring every 2 to 3 days. She is currently undergoing physical therapy for her back, which has improved her strength but not her back condition. She has arthritis and attributes her symptoms to aging.    Her ophthalmologist noted a slight worsening in her blurred vision and expressed concern about her A1c levels. She had blood drawn this morning for an A1c test. She is not aware of having diabetes but acknowledges that her blood sugar levels fluctuate depending on her diet.    She is on losartan and hydrochlorothiazide for blood pressure management. She continues to take Prolia and

## 2025-03-10 ENCOUNTER — HOSPITAL ENCOUNTER (OUTPATIENT)
Dept: INFUSION THERAPY | Age: 75
Discharge: HOME | End: 2025-03-10
Payer: MEDICARE

## 2025-03-10 ENCOUNTER — OFFICE VISIT (OUTPATIENT)
Dept: HEMATOLOGY | Age: 75
End: 2025-03-10
Payer: MEDICARE

## 2025-03-10 VITALS
SYSTOLIC BLOOD PRESSURE: 116 MMHG | HEIGHT: 62 IN | BODY MASS INDEX: 28.17 KG/M2 | WEIGHT: 153.1 LBS | HEART RATE: 68 BPM | DIASTOLIC BLOOD PRESSURE: 62 MMHG | RESPIRATION RATE: 16 BRPM | TEMPERATURE: 97.9 F | OXYGEN SATURATION: 98 %

## 2025-03-10 DIAGNOSIS — R73.9 HYPERGLYCEMIA: ICD-10-CM

## 2025-03-10 DIAGNOSIS — C85.80 MARGINAL ZONE LYMPHOMA (HCC): ICD-10-CM

## 2025-03-10 DIAGNOSIS — C85.80 MARGINAL ZONE LYMPHOMA (HCC): Primary | ICD-10-CM

## 2025-03-10 DIAGNOSIS — Z71.89 CARE PLAN DISCUSSED WITH PATIENT: ICD-10-CM

## 2025-03-10 LAB
ALBUMIN SERPL-MCNC: 4.1 G/DL (ref 3.5–5.2)
ALP SERPL-CCNC: 74 U/L (ref 35–104)
ALT SERPL-CCNC: 20 U/L (ref 5–33)
ANION GAP SERPL CALCULATED.3IONS-SCNC: 13 MMOL/L (ref 7–19)
AST SERPL-CCNC: 26 U/L (ref 5–32)
BASOPHILS # BLD: 0.02 K/UL (ref 0–0.2)
BASOPHILS NFR BLD: 0.3 % (ref 0–1)
BILIRUB SERPL-MCNC: 0.8 MG/DL (ref 0–1.2)
BUN SERPL-MCNC: 27 MG/DL (ref 8–23)
CALCIUM SERPL-MCNC: 9.6 MG/DL (ref 8.8–10.2)
CHLORIDE SERPL-SCNC: 102 MMOL/L (ref 98–107)
CO2 SERPL-SCNC: 24 MMOL/L (ref 22–29)
CREAT SERPL-MCNC: 1.3 MG/DL (ref 0.5–0.9)
EOSINOPHIL # BLD: 0.06 K/UL (ref 0–0.6)
EOSINOPHIL NFR BLD: 0.8 % (ref 0–5)
ERYTHROCYTE [DISTWIDTH] IN BLOOD BY AUTOMATED COUNT: 15.9 % (ref 11.5–14.5)
GLUCOSE SERPL-MCNC: 98 MG/DL (ref 70–99)
HCT VFR BLD AUTO: 41.3 % (ref 37–47)
HGB BLD-MCNC: 13.2 G/DL (ref 12–16)
LDH SERPL-CCNC: 141 U/L (ref 135–214)
LYMPHOCYTES # BLD: 1.48 K/UL (ref 1.1–4.5)
LYMPHOCYTES NFR BLD: 20.2 % (ref 20–40)
MCH RBC QN AUTO: 29.5 PG (ref 27–31)
MCHC RBC AUTO-ENTMCNC: 32 G/DL (ref 33–37)
MCV RBC AUTO: 92.2 FL (ref 81–99)
MONOCYTES # BLD: 0.8 K/UL (ref 0–0.9)
MONOCYTES NFR BLD: 10.9 % (ref 1–10)
NEUTROPHILS # BLD: 4.95 K/UL (ref 1.5–7.5)
NEUTS SEG NFR BLD: 67.4 % (ref 50–65)
PLATELET # BLD AUTO: 188 K/UL (ref 130–400)
PMV BLD AUTO: 10.6 FL (ref 9.4–12.3)
POTASSIUM SERPL-SCNC: 4.9 MMOL/L (ref 3.5–5.1)
PROT SERPL-MCNC: 6.6 G/DL (ref 6.4–8.3)
RBC # BLD AUTO: 4.48 M/UL (ref 4.2–5.4)
SODIUM SERPL-SCNC: 139 MMOL/L (ref 136–145)
URATE SERPL-MCNC: 6.6 MG/DL (ref 2.4–5.7)
WBC # BLD AUTO: 7.34 K/UL (ref 4.8–10.8)

## 2025-03-10 PROCEDURE — 84550 ASSAY OF BLOOD/URIC ACID: CPT

## 2025-03-10 PROCEDURE — G2211 COMPLEX E/M VISIT ADD ON: HCPCS | Performed by: INTERNAL MEDICINE

## 2025-03-10 PROCEDURE — 99212 OFFICE O/P EST SF 10 MIN: CPT

## 2025-03-10 PROCEDURE — G8417 CALC BMI ABV UP PARAM F/U: HCPCS | Performed by: INTERNAL MEDICINE

## 2025-03-10 PROCEDURE — 1090F PRES/ABSN URINE INCON ASSESS: CPT | Performed by: INTERNAL MEDICINE

## 2025-03-10 PROCEDURE — 1036F TOBACCO NON-USER: CPT | Performed by: INTERNAL MEDICINE

## 2025-03-10 PROCEDURE — 36415 COLL VENOUS BLD VENIPUNCTURE: CPT

## 2025-03-10 PROCEDURE — 99213 OFFICE O/P EST LOW 20 MIN: CPT | Performed by: INTERNAL MEDICINE

## 2025-03-10 PROCEDURE — G8427 DOCREV CUR MEDS BY ELIG CLIN: HCPCS | Performed by: INTERNAL MEDICINE

## 2025-03-10 PROCEDURE — 83615 LACTATE (LD) (LDH) ENZYME: CPT

## 2025-03-10 PROCEDURE — 1159F MED LIST DOCD IN RCRD: CPT | Performed by: INTERNAL MEDICINE

## 2025-03-10 PROCEDURE — 1123F ACP DISCUSS/DSCN MKR DOCD: CPT | Performed by: INTERNAL MEDICINE

## 2025-03-10 PROCEDURE — G8400 PT W/DXA NO RESULTS DOC: HCPCS | Performed by: INTERNAL MEDICINE

## 2025-03-10 PROCEDURE — 1126F AMNT PAIN NOTED NONE PRSNT: CPT | Performed by: INTERNAL MEDICINE

## 2025-03-10 PROCEDURE — 80053 COMPREHEN METABOLIC PANEL: CPT

## 2025-03-10 PROCEDURE — 85025 COMPLETE CBC W/AUTO DIFF WBC: CPT

## 2025-03-10 RX ORDER — ACETAMINOPHEN 500 MG
500 TABLET ORAL EVERY 6 HOURS
COMMUNITY

## 2025-04-16 DIAGNOSIS — R73.9 HYPERGLYCEMIA: ICD-10-CM

## 2025-04-16 DIAGNOSIS — C85.80 MARGINAL ZONE LYMPHOMA (HCC): ICD-10-CM

## 2025-04-16 LAB
ALBUMIN SERPL-MCNC: 4 G/DL (ref 3.5–5.2)
ALP SERPL-CCNC: 72 U/L (ref 35–104)
ALT SERPL-CCNC: 23 U/L (ref 10–35)
ANION GAP SERPL CALCULATED.3IONS-SCNC: 11 MMOL/L (ref 8–16)
AST SERPL-CCNC: 25 U/L (ref 10–35)
BASOPHILS # BLD: 0 K/UL (ref 0–0.2)
BASOPHILS NFR BLD: 0.4 % (ref 0–1)
BILIRUB SERPL-MCNC: 0.5 MG/DL (ref 0.2–1.2)
BUN SERPL-MCNC: 19 MG/DL (ref 8–23)
CALCIUM SERPL-MCNC: 9.5 MG/DL (ref 8.8–10.2)
CHLORIDE SERPL-SCNC: 103 MMOL/L (ref 98–107)
CO2 SERPL-SCNC: 26 MMOL/L (ref 22–29)
CREAT SERPL-MCNC: 1.3 MG/DL (ref 0.5–0.9)
EOSINOPHIL # BLD: 0.1 K/UL (ref 0–0.6)
EOSINOPHIL NFR BLD: 1 % (ref 0–5)
ERYTHROCYTE [DISTWIDTH] IN BLOOD BY AUTOMATED COUNT: 16.6 % (ref 11.5–14.5)
GLUCOSE SERPL-MCNC: 113 MG/DL (ref 70–99)
HBA1C MFR BLD: 6.4 % (ref 4–5.6)
HCT VFR BLD AUTO: 40.4 % (ref 37–47)
HGB BLD-MCNC: 12.6 G/DL (ref 12–16)
IMM GRANULOCYTES # BLD: 0 K/UL
LYMPHOCYTES # BLD: 1.3 K/UL (ref 1.1–4.5)
LYMPHOCYTES NFR BLD: 18.9 % (ref 20–40)
MCH RBC QN AUTO: 29.4 PG (ref 27–31)
MCHC RBC AUTO-ENTMCNC: 31.2 G/DL (ref 33–37)
MCV RBC AUTO: 94.2 FL (ref 81–99)
MONOCYTES # BLD: 0.7 K/UL (ref 0–0.9)
MONOCYTES NFR BLD: 9.6 % (ref 0–10)
NEUTROPHILS # BLD: 4.8 K/UL (ref 1.5–7.5)
NEUTS SEG NFR BLD: 69.7 % (ref 50–65)
PLATELET # BLD AUTO: 184 K/UL (ref 130–400)
PMV BLD AUTO: 10.6 FL (ref 9.4–12.3)
POTASSIUM SERPL-SCNC: 3.6 MMOL/L (ref 3.5–5.1)
PROT SERPL-MCNC: 6 G/DL (ref 6.4–8.3)
RBC # BLD AUTO: 4.29 M/UL (ref 4.2–5.4)
SODIUM SERPL-SCNC: 140 MMOL/L (ref 136–145)
WBC # BLD AUTO: 6.9 K/UL (ref 4.8–10.8)

## 2025-05-05 ENCOUNTER — TELEPHONE (OUTPATIENT)
Dept: HEMATOLOGY | Age: 75
End: 2025-05-05

## 2025-05-05 DIAGNOSIS — C85.80 MARGINAL ZONE LYMPHOMA (HCC): Primary | ICD-10-CM

## 2025-05-05 NOTE — TELEPHONE ENCOUNTER
CT scans ordered for patient at this time. Electronically signed by Lis Jerez RN on 5/5/2025 at 12:02 PM

## 2025-05-06 ENCOUNTER — TELEPHONE (OUTPATIENT)
Dept: HEMATOLOGY | Age: 75
End: 2025-05-06

## 2025-05-06 NOTE — TELEPHONE ENCOUNTER
Spoke with Joan on the phone, let her know about her scans in [facility].  She was plannig to go early to get her contrast to drink.

## 2025-05-21 ENCOUNTER — HOSPITAL ENCOUNTER (OUTPATIENT)
Dept: GENERAL RADIOLOGY | Age: 75
Discharge: HOME | End: 2025-05-21
Payer: MEDICARE

## 2025-05-21 DIAGNOSIS — C85.80 MARGINAL ZONE LYMPHOMA (HCC): ICD-10-CM

## 2025-05-21 PROCEDURE — 71250 CT THORAX DX C-: CPT

## 2025-05-21 PROCEDURE — 2500000003 HC RX 250 WO HCPCS: Performed by: INTERNAL MEDICINE

## 2025-05-21 PROCEDURE — 74176 CT ABD & PELVIS W/O CONTRAST: CPT

## 2025-05-21 RX ADMIN — BARIUM SULFATE 450 ML: 21 SUSPENSION ORAL at 10:36

## 2025-06-05 ENCOUNTER — TELEPHONE (OUTPATIENT)
Dept: HEMATOLOGY | Age: 75
End: 2025-06-05

## 2025-06-05 NOTE — TELEPHONE ENCOUNTER
Called Patient and reminded patient of their appointment on 06/10/2025 and patient confirmed they would be here. Reminded patient to just come at appointment time, and to not come at the lab appointment time.  We have now moved to the [facility] [place] that is located between our old office and the ER at the [facility]. Letting the Pt know that our front entrance faces the  [place]. Reminded pt to eat well and be well hydrated for their labs

## 2025-06-09 DIAGNOSIS — C85.80 MARGINAL ZONE LYMPHOMA (HCC): Primary | ICD-10-CM

## 2025-06-09 NOTE — PROGRESS NOTES
disease status in 6 months, next due Nov 2025  Continue follow-up with Physical Therapy       Follow Up:  Return in about 3 months (around 9/10/2025) for CBC, CMP, Appointment with Dr. Cat.  Data Unavailable    IAmie am pre charting  as Medical Assistant for Maura Cat MD. Electronically signed by Amie Carpenter MA on 6/10/2025 at 5:17 PM CDT.  Electronically signed by Maura Cat MD on 6/10/2025 at 1:09 PM       I have seen, examined and reviewed this patient medication list, appropriate labs and imaging studies. I reviewed relevant medical records and others physician’s notes. I discussed the plans of care with the patient. I answered all the questions to the patient’s satisfaction. I have also reviewed the chief complaint (CC) and part of the history (History of Present Illness (HPI), Past Family Social History (PFSH), or Review of Systems (ROS) and made changes when appropriated.       (Please note that portions of this note were completed with a voice recognition program. Efforts were made to edit the dictations but occasionally words are mis-transcribed.)

## 2025-06-10 ENCOUNTER — HOSPITAL ENCOUNTER (OUTPATIENT)
Dept: INFUSION THERAPY | Age: 75
Discharge: HOME | End: 2025-06-10
Payer: MEDICARE

## 2025-06-10 ENCOUNTER — OFFICE VISIT (OUTPATIENT)
Dept: HEMATOLOGY | Age: 75
End: 2025-06-10
Payer: MEDICARE

## 2025-06-10 VITALS
DIASTOLIC BLOOD PRESSURE: 72 MMHG | BODY MASS INDEX: 28.29 KG/M2 | TEMPERATURE: 97.8 F | HEIGHT: 62 IN | OXYGEN SATURATION: 97 % | WEIGHT: 153.7 LBS | SYSTOLIC BLOOD PRESSURE: 126 MMHG | HEART RATE: 74 BPM

## 2025-06-10 DIAGNOSIS — Z71.89 CARE PLAN DISCUSSED WITH PATIENT: Primary | ICD-10-CM

## 2025-06-10 DIAGNOSIS — R53.83 OTHER FATIGUE: ICD-10-CM

## 2025-06-10 DIAGNOSIS — Z51.11 CHEMOTHERAPY MANAGEMENT, ENCOUNTER FOR: ICD-10-CM

## 2025-06-10 DIAGNOSIS — Z79.899 HIGH RISK MEDICATION USE: ICD-10-CM

## 2025-06-10 DIAGNOSIS — C85.80 MARGINAL ZONE LYMPHOMA (HCC): ICD-10-CM

## 2025-06-10 DIAGNOSIS — N28.9 RENAL IMPAIRMENT: ICD-10-CM

## 2025-06-10 LAB
ALBUMIN SERPL-MCNC: 4.1 G/DL (ref 3.5–5.2)
ALP SERPL-CCNC: 82 U/L (ref 35–104)
ALT SERPL-CCNC: 22 U/L (ref 5–33)
ANION GAP SERPL CALCULATED.3IONS-SCNC: 14 MMOL/L (ref 7–19)
AST SERPL-CCNC: 28 U/L (ref 5–32)
BASOPHILS # BLD: 0.03 K/UL (ref 0–0.2)
BASOPHILS NFR BLD: 0.4 % (ref 0–1)
BILIRUB SERPL-MCNC: 0.9 MG/DL (ref 0–1.2)
BUN SERPL-MCNC: 19 MG/DL (ref 8–23)
CALCIUM SERPL-MCNC: 8.9 MG/DL (ref 8.8–10.2)
CHLORIDE SERPL-SCNC: 102 MMOL/L (ref 98–107)
CO2 SERPL-SCNC: 24 MMOL/L (ref 22–29)
CREAT SERPL-MCNC: 1.3 MG/DL (ref 0.5–0.9)
EOSINOPHIL # BLD: 0.04 K/UL (ref 0–0.6)
EOSINOPHIL NFR BLD: 0.5 % (ref 0–5)
ERYTHROCYTE [DISTWIDTH] IN BLOOD BY AUTOMATED COUNT: 17.1 % (ref 11.5–14.5)
GLUCOSE SERPL-MCNC: 104 MG/DL (ref 70–99)
HCT VFR BLD AUTO: 39.3 % (ref 37–47)
HGB BLD-MCNC: 12.8 G/DL (ref 12–16)
LDH SERPL-CCNC: 132 U/L (ref 135–214)
LYMPHOCYTES # BLD: 1.42 K/UL (ref 1.1–4.5)
LYMPHOCYTES NFR BLD: 17.5 % (ref 20–40)
MCH RBC QN AUTO: 30.1 PG (ref 27–31)
MCHC RBC AUTO-ENTMCNC: 32.6 G/DL (ref 33–37)
MCV RBC AUTO: 92.5 FL (ref 81–99)
MONOCYTES # BLD: 0.71 K/UL (ref 0–0.9)
MONOCYTES NFR BLD: 8.8 % (ref 1–10)
NEUTROPHILS # BLD: 5.88 K/UL (ref 1.5–7.5)
NEUTS SEG NFR BLD: 72.4 % (ref 50–65)
PLATELET # BLD AUTO: 172 K/UL (ref 130–400)
PMV BLD AUTO: 10.1 FL (ref 9.4–12.3)
POTASSIUM SERPL-SCNC: 4.3 MMOL/L (ref 3.5–5.1)
PROT SERPL-MCNC: 6.4 G/DL (ref 6.4–8.3)
RBC # BLD AUTO: 4.25 M/UL (ref 4.2–5.4)
SODIUM SERPL-SCNC: 140 MMOL/L (ref 136–145)
URATE SERPL-MCNC: 5.6 MG/DL (ref 2.4–5.7)
WBC # BLD AUTO: 8.11 K/UL (ref 4.8–10.8)

## 2025-06-10 PROCEDURE — 85025 COMPLETE CBC W/AUTO DIFF WBC: CPT

## 2025-06-10 PROCEDURE — 83615 LACTATE (LD) (LDH) ENZYME: CPT

## 2025-06-10 PROCEDURE — 99214 OFFICE O/P EST MOD 30 MIN: CPT | Performed by: INTERNAL MEDICINE

## 2025-06-10 PROCEDURE — G2211 COMPLEX E/M VISIT ADD ON: HCPCS | Performed by: INTERNAL MEDICINE

## 2025-06-10 PROCEDURE — 1159F MED LIST DOCD IN RCRD: CPT | Performed by: INTERNAL MEDICINE

## 2025-06-10 PROCEDURE — 36415 COLL VENOUS BLD VENIPUNCTURE: CPT

## 2025-06-10 PROCEDURE — 80053 COMPREHEN METABOLIC PANEL: CPT

## 2025-06-10 PROCEDURE — 1126F AMNT PAIN NOTED NONE PRSNT: CPT | Performed by: INTERNAL MEDICINE

## 2025-06-10 PROCEDURE — 1036F TOBACCO NON-USER: CPT | Performed by: INTERNAL MEDICINE

## 2025-06-10 PROCEDURE — 99212 OFFICE O/P EST SF 10 MIN: CPT

## 2025-06-10 PROCEDURE — G8427 DOCREV CUR MEDS BY ELIG CLIN: HCPCS | Performed by: INTERNAL MEDICINE

## 2025-06-10 PROCEDURE — G8400 PT W/DXA NO RESULTS DOC: HCPCS | Performed by: INTERNAL MEDICINE

## 2025-06-10 PROCEDURE — G8417 CALC BMI ABV UP PARAM F/U: HCPCS | Performed by: INTERNAL MEDICINE

## 2025-06-10 PROCEDURE — 84550 ASSAY OF BLOOD/URIC ACID: CPT

## 2025-06-10 PROCEDURE — 1090F PRES/ABSN URINE INCON ASSESS: CPT | Performed by: INTERNAL MEDICINE

## 2025-06-10 PROCEDURE — 1123F ACP DISCUSS/DSCN MKR DOCD: CPT | Performed by: INTERNAL MEDICINE

## 2025-06-30 DIAGNOSIS — C85.80 MARGINAL ZONE LYMPHOMA (HCC): ICD-10-CM

## 2025-06-30 RX ORDER — ACALABRUTINIB 100 MG/1
100 CAPSULE, GELATIN COATED ORAL DAILY
Qty: 30 CAPSULE | Refills: 5 | Status: ACTIVE | OUTPATIENT
Start: 2025-06-30

## 2025-06-30 NOTE — TELEPHONE ENCOUNTER
Clinic received message from Madhavi Gonzales regarding refill of medication. Script has been sent to  to sign and it will be sent to appropriate pharmacy.

## 2025-07-24 DIAGNOSIS — C85.80 MARGINAL ZONE LYMPHOMA (HCC): ICD-10-CM

## 2025-07-24 LAB
BASOPHILS # BLD: 0.03 K/UL (ref 0–0.2)
BASOPHILS NFR BLD: 0.5 % (ref 0–1)
EOSINOPHIL # BLD: 0.11 K/UL (ref 0–0.6)
EOSINOPHIL NFR BLD: 1.9 % (ref 0–5)
ERYTHROCYTE [DISTWIDTH] IN BLOOD BY AUTOMATED COUNT: 16.7 % (ref 11.5–14.5)
HCT VFR BLD AUTO: 38.6 % (ref 37–47)
HGB BLD-MCNC: 12.3 G/DL (ref 12–16)
LYMPHOCYTES # BLD: 1.19 K/UL (ref 1.1–4.5)
LYMPHOCYTES NFR BLD: 20.8 % (ref 20–40)
MCH RBC QN AUTO: 30.5 PG (ref 27–31)
MCHC RBC AUTO-ENTMCNC: 31.9 G/DL (ref 33–37)
MCV RBC AUTO: 95.8 FL (ref 81–99)
MONOCYTES # BLD: 0.53 K/UL (ref 0–0.9)
MONOCYTES NFR BLD: 9.3 % (ref 1–10)
NEUTROPHILS # BLD: 3.84 K/UL (ref 1.5–7.5)
NEUTS SEG NFR BLD: 67.2 % (ref 50–65)
PLATELET # BLD AUTO: 167 K/UL (ref 130–400)
PMV BLD AUTO: 11.2 FL (ref 9.4–12.3)
RBC # BLD AUTO: 4.03 M/UL (ref 4.2–5.4)
WBC # BLD AUTO: 5.72 K/UL (ref 4.8–10.8)

## 2025-07-30 LAB
ALBUMIN SERPL-MCNC: 4 G/DL (ref 3.5–5.2)
ALP SERPL-CCNC: 69 U/L (ref 35–104)
ALT SERPL-CCNC: 19 U/L (ref 10–35)
ANION GAP SERPL CALCULATED.3IONS-SCNC: 14 MMOL/L (ref 8–16)
AST SERPL-CCNC: 25 U/L (ref 10–35)
BILIRUB SERPL-MCNC: 0.7 MG/DL (ref 0.2–1.2)
BUN SERPL-MCNC: 28 MG/DL (ref 8–23)
CALCIUM SERPL-MCNC: 9.8 MG/DL (ref 8.8–10.2)
CHLORIDE SERPL-SCNC: 101 MMOL/L (ref 98–107)
CO2 SERPL-SCNC: 24 MMOL/L (ref 22–29)
CREAT SERPL-MCNC: 1.6 MG/DL (ref 0.5–0.9)
GLUCOSE SERPL-MCNC: 104 MG/DL (ref 70–99)
POTASSIUM SERPL-SCNC: 3.9 MMOL/L (ref 3.5–5.1)
PROT SERPL-MCNC: 6 G/DL (ref 6.4–8.3)
SODIUM SERPL-SCNC: 139 MMOL/L (ref 136–145)

## (undated) DEVICE — SOLIDIFIER LIQUI LOC PLUS 2000CC

## (undated) DEVICE — INTRO SHEATH FASTCATH TRNSEP SL1 .032IN 8F 63CM

## (undated) DEVICE — ACCESS SHEATH WITH DILATOR: Brand: WATCHMAN™ TRUSEAL™ ACCESS SYSTEM

## (undated) DEVICE — SOL IRR NACL 0.9PCT BT 1000ML

## (undated) DEVICE — CATH F6 ST+ MP A1 100CM: Brand: SUPER TORQUE

## (undated) DEVICE — PAD, DEFIB, ADULT, RADIOTRANS, PHYSIO: Brand: MEDLINE

## (undated) DEVICE — KT NDL GUIDE STRL 18GA

## (undated) DEVICE — GW AMPLTZ SUPERSTIFF 3MMJTIP .035IN 260CM

## (undated) DEVICE — Device

## (undated) DEVICE — SYS TRNSEP ACC BRK ACROSS A/ 71CM

## (undated) DEVICE — CATH F6INF PIG 145 110CM 6SH: Brand: INFINITI

## (undated) DEVICE — PINNACLE INTRODUCER SHEATH: Brand: PINNACLE

## (undated) DEVICE — TIBURON BRACHIAL ANGIOGRAPHY DRAPE: Brand: CONVERTORS

## (undated) DEVICE — Device: Brand: MEDEX

## (undated) DEVICE — CANN NASL ETCO2 LO/FLO A/

## (undated) DEVICE — PK CATH CARD 30 CA/4